# Patient Record
Sex: MALE | Race: WHITE | NOT HISPANIC OR LATINO | Employment: PART TIME | URBAN - METROPOLITAN AREA
[De-identification: names, ages, dates, MRNs, and addresses within clinical notes are randomized per-mention and may not be internally consistent; named-entity substitution may affect disease eponyms.]

---

## 2017-12-15 ENCOUNTER — HOSPITAL ENCOUNTER (OUTPATIENT)
Dept: SLEEP CENTER | Facility: CLINIC | Age: 72
Discharge: HOME | End: 2017-12-15
Payer: MEDICARE

## 2017-12-15 ENCOUNTER — TRANSCRIBE ORDERS (OUTPATIENT)
Dept: SLEEP CENTER | Facility: CLINIC | Age: 72
End: 2017-12-15

## 2017-12-15 DIAGNOSIS — G47.33 OSA (OBSTRUCTIVE SLEEP APNEA): Primary | ICD-10-CM

## 2017-12-15 DIAGNOSIS — G47.33 OSA (OBSTRUCTIVE SLEEP APNEA): ICD-10-CM

## 2018-05-07 ENCOUNTER — OFFICE VISIT (OUTPATIENT)
Dept: SLEEP CENTER | Facility: CLINIC | Age: 73
End: 2018-05-07

## 2018-05-07 VITALS
WEIGHT: 234.4 LBS | SYSTOLIC BLOOD PRESSURE: 110 MMHG | HEART RATE: 72 BPM | DIASTOLIC BLOOD PRESSURE: 70 MMHG | BODY MASS INDEX: 33.56 KG/M2 | HEIGHT: 70 IN

## 2018-05-07 DIAGNOSIS — G47.33 OSA (OBSTRUCTIVE SLEEP APNEA): Primary | ICD-10-CM

## 2018-05-07 DIAGNOSIS — G89.4 CHRONIC PAIN DISORDER: ICD-10-CM

## 2018-05-07 DIAGNOSIS — I10 ESSENTIAL HYPERTENSION: ICD-10-CM

## 2018-05-07 DIAGNOSIS — E66.9 OBESITY (BMI 30-39.9): ICD-10-CM

## 2018-05-07 DIAGNOSIS — G25.81 RESTLESS LEG SYNDROME: ICD-10-CM

## 2018-05-07 DIAGNOSIS — G47.61 PLMD (PERIODIC LIMB MOVEMENT DISORDER): ICD-10-CM

## 2018-05-07 RX ORDER — METHOCARBAMOL 750 MG/1
TABLET, FILM COATED ORAL
Refills: 0 | COMMUNITY
Start: 2018-02-15

## 2018-05-07 RX ORDER — GABAPENTIN 100 MG/1
100 CAPSULE ORAL 3 TIMES DAILY
Refills: 3 | COMMUNITY
Start: 2018-02-15

## 2018-05-07 RX ORDER — TADALAFIL 5 MG
TABLET ORAL
COMMUNITY
Start: 2018-02-20

## 2018-05-07 RX ORDER — ATORVASTATIN CALCIUM 10 MG/1
TABLET, FILM COATED ORAL
COMMUNITY
Start: 2017-10-02

## 2018-05-07 RX ORDER — TRAMADOL HYDROCHLORIDE 50 MG/1
50 TABLET ORAL EVERY 6 HOURS PRN
COMMUNITY
End: 2022-07-28

## 2018-05-07 RX ORDER — KRILL/OM-3/DHA/EPA/PHOSPHO/AST 500MG-86MG
500 CAPSULE ORAL
COMMUNITY

## 2018-05-07 RX ORDER — OLMESARTAN MEDOXOMIL AND HYDROCHLOROTHIAZIDE 40/12.5 40; 12.5 MG/1; MG/1
TABLET ORAL
COMMUNITY
Start: 2018-04-28

## 2018-05-07 RX ORDER — GLUCOSAMINE/CHONDR SU A SOD 750-600 MG
1 TABLET ORAL
COMMUNITY
Start: 2012-12-05

## 2018-05-07 NOTE — PROGRESS NOTES
Follow-Up Note - Sleep Center   Shay Peralta  71 y o  male  :1945  USX:[id number]    CC: I saw this patient for follow-up in clinic today for his Sleep Disordered Breathing, Coexisting Sleep and Medical Problems  Medications, Past, Family & Social History were reviewed  Interval changes: [none reported ]   He  has no past medical history on file  He has a current medication list which includes the following prescription(s): aspirin, atorvastatin, cholecalciferol, cialis, co-enzyme q-10, cyanocobalamin, gabapentin, glucosamine hcl, krill oil, lactobacillus acid-pectin, methocarbamol, multiple vitamins-minerals, olmesartan-hydrochlorothiazide, and tramadol  ROS: reviewed, see attached   HPI:  With respect to sleep disordered breathing, compliance data download shows: [ No data was available, but he reports use for > 4hours regularly]   Kulwinder Branch reports:   · no difficulty tolerating PAP;   · Experiencing minor adverse effects]  [ dry mouth] for which Biotene helps  · benefiting from use ; [sleeping better]   · He has ongoing hip pain due to degenerative joint disease that disturb sleep  · He also reports restless leg symptoms around 2 times a week and it is associated with jerking movements during sleep   · He got a prescription from his primary care physician for gabapentin to address an acute episode of back pain  Sleep Routine:  He reports getting 6-8 hours sleep and at times longer; he has no difficulty initiating, but reports diffculty maintaining sleep  for which he uses over-the-counter sleep aid  He awakens with the aid of an alarm feeling refreshed  He moves excessively during sleep and bed is disheveled  He denies excessive drowsiness  [facility] rated himself at Total score: 2 /24 on the [place] sleepiness scale ]      EXAM: Vitals are stable: Blood pressure 110/70, pulse 72, height 5' 10" (1 778 m), weight 106 kg (234 lb 6 4 oz)  Body mass index is 33 63 kg/m²  Georgette [place] Circumference: 46 cm  Patient is alert, orientated, cooperative [and in no distress]  Mental state [appears normal]  There are [no] facial pressure marks or rashes  Physical findings are essentially unchanged from previous  IMPRESSION:     1  NARCISA (obstructive sleep apnea)     2  Restless leg syndrome     3  PLMD (periodic limb movement disorder)     4  Chronic pain disorder     5  Obesity (BMI 30-39 9)     6  Essential hypertension         PLAN:  1  The patient's current unit has now reached its five-year reasonable useful life and needs to be replaced  2  I reviewed results of the Sleep studies with the patient:  He has diagnostic study in 2009 demonstrated severe obstructive sleep apnea:  AHI of 67 per hour with minimum oxygen saturation of 81% and 46 4% of total sleep time spent with saturations less than 90%  During a subsequent therapeutic study, he is sleep disordered breathing was adequately remediated with nasal CPAP at 17 cm H2O     3  Treatment with  PAP is medically necessary and José Mccann is agreable to continue use  He declined a retitration study  4  Pressure setting: Continue at 16 cm     5  Instruction on care of equipment and strategies to improve comfort with use of PAP were discussed [:controlling mucosal dryness, nasal symptoms and Mask leaks]  6  Care coordinated with DME provider and prescription to replace supplies as needed was provided  7  Need for compliance with therapy and weight reduction were emphasized  8  I advised using gabapentin q h s  for the restless leg symptoms and and in place of Dramamine that he is currently using as a sleep aid  9  With your consent, follow-up is advised in 2 months [to monitor progress, compliance and to adjust therapy]  Thank you for allowing me to participate in the care of this patient      Sincerely,    Tarsha Saleh MD  Board Certified Specialist

## 2018-07-09 ENCOUNTER — OFFICE VISIT (OUTPATIENT)
Dept: SLEEP CENTER | Facility: CLINIC | Age: 73
End: 2018-07-09
Payer: COMMERCIAL

## 2018-07-09 VITALS
WEIGHT: 235 LBS | HEART RATE: 66 BPM | DIASTOLIC BLOOD PRESSURE: 62 MMHG | HEIGHT: 70 IN | SYSTOLIC BLOOD PRESSURE: 118 MMHG | BODY MASS INDEX: 33.64 KG/M2

## 2018-07-09 DIAGNOSIS — G47.33 OSA (OBSTRUCTIVE SLEEP APNEA): Primary | ICD-10-CM

## 2018-07-09 DIAGNOSIS — I10 ESSENTIAL HYPERTENSION: ICD-10-CM

## 2018-07-09 DIAGNOSIS — G47.61 PLMD (PERIODIC LIMB MOVEMENT DISORDER): ICD-10-CM

## 2018-07-09 DIAGNOSIS — G89.4 CHRONIC PAIN DISORDER: ICD-10-CM

## 2018-07-09 DIAGNOSIS — E66.9 OBESITY (BMI 30-39.9): ICD-10-CM

## 2018-07-09 DIAGNOSIS — G25.81 RESTLESS LEG SYNDROME: ICD-10-CM

## 2018-07-09 PROCEDURE — 99214 OFFICE O/P EST MOD 30 MIN: CPT | Performed by: INTERNAL MEDICINE

## 2018-07-09 NOTE — PROGRESS NOTES
Follow-Up Note - Sleep Center   Kirsten Rios  71 y o  male  :1945  [facility]:[id number]    CC: I saw this patient for follow-up in clinic today for his Sleep Disordered Breathing, Coexisting Sleep and Medical Problems  PFSH, Problem List, Medications & Allergies were reviewed in EMR  Interval changes: [none reported ]   He  has no past medical history on file  He has a current medication list which includes the following prescription(s): aspirin, atorvastatin, cialis, co-enzyme q-10, cyanocobalamin, gabapentin, glucosamine hcl, krill oil, multiple vitamins-minerals, olmesartan-hydrochlorothiazide, tramadol, cholecalciferol, lactobacillus acid-pectin, and methocarbamol  ROS: Reviewed (see attached)  HPI:  With respect to sleep disordered breathing, compliance data download shows:  using PAP > 4 hours per night 93% of the time  NISREEN (estimated) 1 7/hour at [90th percentile] pressure of 16 1cm H2O   Rebecca Colbert reports  · no  difficulty tolerating PAP;   · some adverse effects: dry mouth, mask leaks  and mask discomfort  · Benefitting from use: sleeping better   · Restless leg symptoms have improved since he got iron infusion and started iron supplementation  He has also been able to reduce he has gabapentin 200 mg q h s     And he is not requiring tramadol  However, he continues to report jerking movements during sleep  Sleep Routine:  He reports getting 7-1/2 hours sleep on week nights and more on weekends; he  has difficulty initiating and maintaining sleep   He attributes this to lower dose of gabapentin and of mask discomfort  He awakens spontaneously feeling not always refreshed  He denies excessive drowsiness  [facility] rated himself at Total score: 2 /24 on the [place] sleepiness scale ]    Habits:[ reports that he has never smoked  He has never used smokeless tobacco ], [ reports that he drinks alcohol ], [ reports that he does not use drugs  ], Caffeine use: limited , Exercise routine: none but he is active at work  EXAM: /62   Pulse 66   Ht 5' 10" (1 778 m)   Wt 107 kg (235 lb)   BMI 33 72 kg/m²      Patient is alert, orientated, cooperative [and in no distress]  Mental state [appears normal]  Craniofacial anatomy normal  There are [no] facial pressure marks or rashes  Neck Circumference: 46 cm  There are no abnormal neck masses  Nasal airway is [patent ]  Mucous membranes appeared normal  The oral airway [is crowded ] Base of tongue is at Mallampati class IV (only hard palate visible)  [Apart from truncal obesity,] the rest of exam (Heart, Lungs, Abdomen, CNS and Musculoskeletal systems) was unremarkable   IMPRESSION:     1  NARCISA (obstructive sleep apnea)     2  Restless leg syndrome     3  PLMD (periodic limb movement disorder)     4  Chronic pain disorder     5  Obesity (BMI 30-39 9)     6  Essential hypertension         PLAN:  1  Treatment with  PAP is medically necessary and Thomas Proper is agreable to continue use  2  Instruction on care of equipment and strategies to improve comfort with use of PAP were discussed  3  Care coordinated with DME provider and Rx to replace supplies provided  4  Pressure setting:  Continue at 16-18 cm H2O     5  Compliance with therapy was emphasizedcand strategies for weight reduction discussed  6  I advised increasing gabapentin 200-300 q h s   7  With your consent, follow-up is advised in [1 [place] K[place] [or sooner if needed] [to monitor progress, compliance and to adjust therapy]  Thank you for allowing me to participate in the care of this patient      Sincerely,    Authenticated electronically by Andrew Randle MD on 28/05/21   Board Certified Specialist

## 2018-07-09 NOTE — PROGRESS NOTES
Review of Systems      Genitourinary none   Cardiology none   Gastrointestinal none   Neurology need to move extremities and muscle weakness   Constitutional none   Integumentary none   Psychiatry none   Musculoskeletal muscle aches and legs twitching/jerking   Pulmonary none   ENT none   Endocrine none   Hematological none

## 2018-07-13 ENCOUNTER — TELEPHONE (OUTPATIENT)
Dept: SLEEP CENTER | Facility: CLINIC | Age: 73
End: 2018-07-13

## 2021-01-04 ENCOUNTER — IMMUNIZATIONS (OUTPATIENT)
Dept: FAMILY MEDICINE CLINIC | Facility: HOSPITAL | Age: 76
End: 2021-01-04

## 2021-01-04 DIAGNOSIS — Z23 ENCOUNTER FOR IMMUNIZATION: ICD-10-CM

## 2021-01-04 PROCEDURE — 91301 SARS-COV-2 / COVID-19 MRNA VACCINE (MODERNA) 100 MCG: CPT

## 2021-01-04 PROCEDURE — 0011A SARS-COV-2 / COVID-19 MRNA VACCINE (MODERNA) 100 MCG: CPT

## 2021-02-08 ENCOUNTER — IMMUNIZATIONS (OUTPATIENT)
Dept: FAMILY MEDICINE CLINIC | Facility: HOSPITAL | Age: 76
End: 2021-02-08

## 2021-02-08 DIAGNOSIS — Z23 ENCOUNTER FOR IMMUNIZATION: Primary | ICD-10-CM

## 2021-02-08 PROCEDURE — 91301 SARS-COV-2 / COVID-19 MRNA VACCINE (MODERNA) 100 MCG: CPT

## 2021-02-08 PROCEDURE — 0012A SARS-COV-2 / COVID-19 MRNA VACCINE (MODERNA) 100 MCG: CPT

## 2021-08-30 PROBLEM — M15.0 PRIMARY GENERALIZED (OSTEO)ARTHRITIS: Status: ACTIVE | Noted: 2021-08-30

## 2021-08-30 PROBLEM — K21.9 GERD (GASTROESOPHAGEAL REFLUX DISEASE): Status: ACTIVE | Noted: 2021-08-30

## 2021-08-30 PROBLEM — M47.816 LUMBAR SPONDYLOSIS: Status: ACTIVE | Noted: 2021-08-30

## 2021-08-30 PROBLEM — E78.5 HYPERLIPIDEMIA: Status: ACTIVE | Noted: 2021-08-30

## 2021-08-30 PROBLEM — D50.9 IRON DEFICIENCY ANEMIA: Status: ACTIVE | Noted: 2021-08-30

## 2022-03-02 ENCOUNTER — ESTABLISHED COMPREHENSIVE EXAM (OUTPATIENT)
Dept: URBAN - METROPOLITAN AREA CLINIC 6 | Facility: CLINIC | Age: 77
End: 2022-03-02

## 2022-03-02 DIAGNOSIS — H43.813: ICD-10-CM

## 2022-03-02 DIAGNOSIS — H02.885: ICD-10-CM

## 2022-03-02 DIAGNOSIS — H18.513: ICD-10-CM

## 2022-03-02 DIAGNOSIS — H26.492: ICD-10-CM

## 2022-03-02 DIAGNOSIS — H35.371: ICD-10-CM

## 2022-03-02 DIAGNOSIS — H02.882: ICD-10-CM

## 2022-03-02 DIAGNOSIS — Z96.1: ICD-10-CM

## 2022-03-02 PROCEDURE — 92014 COMPRE OPH EXAM EST PT 1/>: CPT

## 2022-03-02 PROCEDURE — 92134 CPTRZ OPH DX IMG PST SGM RTA: CPT

## 2022-03-02 ASSESSMENT — VISUAL ACUITY
OD_CC: 20/30-1
OD_GLARE: 20/70
OS_GLARE: 20/60
OU_CC: J1
OS_CC: 20/30-2

## 2022-03-02 ASSESSMENT — TONOMETRY
OS_IOP_MMHG: 14
OD_IOP_MMHG: 13

## 2022-04-05 ENCOUNTER — SURGERY/PROCEDURE (OUTPATIENT)
Dept: URBAN - METROPOLITAN AREA SURGICAL CENTER 6 | Facility: SURGICAL CENTER | Age: 77
End: 2022-04-05

## 2022-04-05 DIAGNOSIS — H26.492: ICD-10-CM

## 2022-04-05 DIAGNOSIS — Z96.1: ICD-10-CM

## 2022-04-05 PROCEDURE — 66821 AFTER CATARACT LASER SURGERY: CPT

## 2022-04-13 ENCOUNTER — 1 WEEK POST-OP (OUTPATIENT)
Dept: URBAN - METROPOLITAN AREA CLINIC 6 | Facility: CLINIC | Age: 77
End: 2022-04-13

## 2022-04-13 DIAGNOSIS — Z96.1: ICD-10-CM

## 2022-04-13 PROCEDURE — 99024 POSTOP FOLLOW-UP VISIT: CPT

## 2022-04-13 ASSESSMENT — VISUAL ACUITY
OU_CC: J2
OD_CC: 20/25
OS_CC: 20/20

## 2022-04-13 ASSESSMENT — TONOMETRY
OS_IOP_MMHG: 11
OD_IOP_MMHG: 12

## 2022-04-25 PROBLEM — M18.0 PRIMARY OSTEOARTHRITIS OF BOTH FIRST CARPOMETACARPAL JOINTS: Status: ACTIVE | Noted: 2022-04-25

## 2022-04-25 PROBLEM — H04.123 DRY EYE SYNDROME OF BOTH EYES: Status: ACTIVE | Noted: 2022-04-25

## 2022-04-25 PROBLEM — M06.09 SERONEGATIVE ARTHROPATHY OF MULTIPLE SITES (HCC): Status: ACTIVE | Noted: 2022-04-25

## 2022-05-13 ENCOUNTER — EVALUATION (OUTPATIENT)
Dept: OCCUPATIONAL THERAPY | Age: 77
End: 2022-05-13
Payer: COMMERCIAL

## 2022-05-13 DIAGNOSIS — M06.09 SERONEGATIVE ARTHROPATHY OF MULTIPLE SITES (HCC): ICD-10-CM

## 2022-05-13 DIAGNOSIS — M15.0 PRIMARY GENERALIZED HYPERTROPHIC OSTEOARTHROSIS: Primary | ICD-10-CM

## 2022-05-13 DIAGNOSIS — M06.09 RHEUMATOID ARTHRITIS OF MULTIPLE SITES WITHOUT RHEUMATOID FACTOR (HCC): ICD-10-CM

## 2022-05-13 DIAGNOSIS — M18.0 PRIMARY OSTEOARTHRITIS OF BOTH FIRST CARPOMETACARPAL JOINTS: ICD-10-CM

## 2022-05-13 DIAGNOSIS — M15.0 PRIMARY GENERALIZED (OSTEO)ARTHRITIS: ICD-10-CM

## 2022-05-13 PROCEDURE — 97110 THERAPEUTIC EXERCISES: CPT

## 2022-05-13 PROCEDURE — 97165 OT EVAL LOW COMPLEX 30 MIN: CPT

## 2022-05-13 NOTE — PROGRESS NOTES
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)  M06 09                   Assessment  Assessment details: Pt is a 76 y/o male who presents to skilled OT tx with c/o worsening b/l hand and thumb pain (R>L) from arthritis  Pt presents with B/L herbens and devi nodes on digits, as well as R thumb/CMC edema  Pt noted with mild to mod dec ROM of thumb and digits, as well as moderately impaired  strength for age value of hands  Pt reports difficulty with ADL tasks such as cooking, writing, and at work pushing a cart  Therapist fabricated pt 2 thumb spica splints to wear as needed and during ADL tasks, but also edu on KT, arthritis compression glove and comfort cool CMC restriction splint as well  Pt edu and provided with HEP of thumb and digit ROM stretch  Pt would benefit from continued skilled OT tx for B/L hand and thumb pain relief, AD education, and arthritis mgmt/edu to inc independence and QOL  Impairments: abnormal coordination, abnormal or restricted ROM, activity intolerance, impaired physical strength, lacks appropriate home exercise program and pain with function    Symptom irritability: lowBarriers to therapy: Work schedule; limited availability   Understanding of Dx/Px/POC: good   Prognosis: good    Goals  STGs:  1  Pt will report dec pain by 50%  2  Pt will be independent with HEP  3  Pt will inc  strength +5 #  LTGs:  1  Pt will report dec pain by 75%  2  Pt will perf and report pain mgmt techniques for arthritis   3  Pt will inc  and pinch strength to WNLs of age group  4   Pt will inc FOTO score    Plan  Patient would benefit from: skilled occupational therapy and custom splinting  Planned modality interventions: fluidotherapy, thermotherapy: hydrocollator packs and thermotherapy: paraffin bath  Planned therapy interventions: manual therapy, massage, joint mobilization, neuromuscular re-education, orthotic management and training, patient education, orthotic fitting/training, compression, self care, strengthening, stretching, therapeutic activities, therapeutic exercise, home exercise program and functional ROM exercises  Frequency: 1x week  Duration in weeks: 4  Treatment plan discussed with: patient        Subjective Evaluation    History of Present Illness  Mechanism of injury: Pt is a 78 y/o male who current c/o worsening B/L hand and thumb pain over the past several months due to arthritis; R>L  Pt c/o moderate pain as well as moderate difficulty with everyday tasks due to arthritis  Recurrent probem    Quality of life: fair    Pain  Current pain ratin  At best pain ratin  At worst pain ratin  Quality: dull ache, tight and pulling  Relieving factors: rest, support and heat  Progression: worsening    Hand dominance: left    Treatments  Current treatment: occupational therapy  Patient Goals  Patient goals for therapy: decreased pain, increased strength, return to work and independence with ADLs/IADLs          Objective     Observations     Left Wrist/Hand   Positive for Sonali's nodes and Heberden's nodes  Right Wrist/Hand   Positive for Sonali's nodes and Heberden's nodes  Additional Observation Details  B/L thumb and digit arthritis, R>L      Neurological Testing     Sensation     Wrist/Hand   Left   Intact: light touch, pin prick and sharp/dull discrimination    Right   Intact: light touch, pin prick and sharp/dull discrimination    Active Range of Motion     Left Wrist   Normal active range of motion    Right Wrist   Normal active range of motion    Left Thumb   Flexion     MP: 35 degrees    DIP: 30 degrees  Palmar Abduction     CMC: 40 degrees  Radial abduction    CMC: 50 degrees    Right Thumb   Flexion     MP: 30    DIP: 30  Palmar Abduction    CMC: 40  Radial Abduction    CMC: 45    Right Digits   Flexion   Index     MCP: 75    PIP: 70    DIP: 60  Middle     MCP: 70    PIP: 70    DIP: 55  Ring     MCP: 75    PIP: 60    DIP: 35  Little     MCP: 75    PIP: 65    DIP: 50  Extension   Little     PIP: -15    Strength/Myotome Testing     Left Wrist/Hand   Wrist extension: 4  Wrist flexion: 4  Radial deviation: 4  Ulnar deviation: 4     (2nd hand position)     Trial 1: 35    Thumb Strength  Key/Lateral Pinch     Trial 1: 18  Tip/Two-Point Pinch     Trial 1: 9  Palmar/Three-Point Pinch     Trial 1: 13    Right Wrist/Hand   Wrist extension: 4  Wrist flexion: 4  Radial deviation: 4  Ulnar deviation: 4     (2nd hand position)     Trial 1: 28 4    Thumb Strength   Key/Lateral Pinch     Trial 1: 18  Tip/Two-Point Pinch     Trial 1: 6  Palmar/Three-Point Pinch     Trial 1: 9    Tests     Left Wrist/Hand   Positive CMC grind  Right Wrist/Hand   Positive CMC grind  Precautions: Universal  HEP- thumb/CMC stretching ([address]), TGE for digits, thumb spica for nights and work tasks PRN        Manuals             IASTM CMC             PROM thumb             KT             Orthosis anabelle             Neuro Re-Ed             Arthritis edu                                                                                           Ther Ex             TGE             Thumb ROM             CMC ABD rolls on TB             OK pinch                                                                 Ther Activity             FMC/strengthening pinch                          Gait Training                                       Modalities             MP/CP             Paraffin

## 2022-05-16 ENCOUNTER — OFFICE VISIT (OUTPATIENT)
Dept: OCCUPATIONAL THERAPY | Age: 77
End: 2022-05-16
Payer: COMMERCIAL

## 2022-05-16 DIAGNOSIS — M06.09 SERONEGATIVE ARTHROPATHY OF MULTIPLE SITES (HCC): ICD-10-CM

## 2022-05-16 DIAGNOSIS — M15.0 PRIMARY GENERALIZED HYPERTROPHIC OSTEOARTHROSIS: Primary | ICD-10-CM

## 2022-05-16 DIAGNOSIS — M15.0 PRIMARY GENERALIZED (OSTEO)ARTHRITIS: ICD-10-CM

## 2022-05-16 DIAGNOSIS — M18.0 PRIMARY OSTEOARTHRITIS OF BOTH FIRST CARPOMETACARPAL JOINTS: ICD-10-CM

## 2022-05-16 PROCEDURE — 97140 MANUAL THERAPY 1/> REGIONS: CPT

## 2022-05-16 PROCEDURE — 97110 THERAPEUTIC EXERCISES: CPT

## 2022-05-16 NOTE — PROGRESS NOTES
Daily Note     Today's date: 2022  Patient name: Kayode Mancilla  :   MRN: [id number]  Referring provider: Ramy Wilder MD  Dx:   Encounter Diagnosis     ICD-10-CM    1  Primary generalized hypertrophic osteoarthrosis  M15 0    2  Primary osteoarthritis of both first carpometacarpal joints  M18 0    3  Primary generalized (osteo)arthritis  M15 0    4  Seronegative arthropathy of multiple sites ([address] )  M06 09                   Subjective: The splint I think really helps      Objective: See treatment diary below      Assessment: Tolerated treatment well  Patient would benefit from continued OT  Trialed [employer] with good tolerance and he reported enjoying the heat cream  Noted loosening up of [address] post treatment      Plan: Continue per plan of care  Precautions: Universal  HEP- thumb/CMC stretching (H drive), TGE for digits, thumb spica for nights and work tasks PRN        Manuals             IASTM CMC 10' STM            PROM thumb             KT             Orthosis anabelle             Neuro Re-Ed             Arthritis edu                                                                                           Ther Ex             TGE             Thumb ROM             CMC ABD rolls on TB 10x b/l            OK pinch [place]                                                                Ther Activity             FMC/strengthening pinch Ke[place]gs 1x                         Gait Training                                       Modalities             MP/CP 5'            Paraffin

## 2022-05-18 ENCOUNTER — APPOINTMENT (OUTPATIENT)
Dept: OCCUPATIONAL THERAPY | Age: 77
End: 2022-05-18
Payer: COMMERCIAL

## 2022-05-23 ENCOUNTER — OFFICE VISIT (OUTPATIENT)
Dept: OCCUPATIONAL THERAPY | Age: 77
End: 2022-05-23
Payer: COMMERCIAL

## 2022-05-23 DIAGNOSIS — M15.0 PRIMARY GENERALIZED HYPERTROPHIC OSTEOARTHROSIS: ICD-10-CM

## 2022-05-23 DIAGNOSIS — M06.09 RHEUMATOID ARTHRITIS OF MULTIPLE SITES WITHOUT RHEUMATOID FACTOR (HCC): Primary | ICD-10-CM

## 2022-05-23 DIAGNOSIS — M18.0 PRIMARY OSTEOARTHRITIS OF BOTH FIRST CARPOMETACARPAL JOINTS: ICD-10-CM

## 2022-05-23 PROCEDURE — 97110 THERAPEUTIC EXERCISES: CPT

## 2022-05-23 PROCEDURE — 97140 MANUAL THERAPY 1/> REGIONS: CPT

## 2022-05-23 NOTE — PROGRESS NOTES
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)  M06 09                   Subjective: 'My R thumb already feels 50% better with the splint and the phone clip"      Objective: See treatment diary below      Assessment: Tolerated treatment well  Patient would benefit from continued OT  Pt reported AD for everyday tasks have healed dec his pain  Edu on stretching of thumb as well as IF and web space  Pt reported G relief with heat, STM and stretching  Plan: Continue per plan of care  Precautions: Universal  HEP- thumb/CMC stretching (H drive), TGE for digits, thumb spica for nights and work tasks PRN        Manuals            IASTM CMC 10' STM 10'           PROM thumb  2'           KT             Orthosis anabelle             Neuro Re-Ed             Arthritis edu  perf                                                                                         Ther Ex             TGE             Thumb ROM  10x 10sec           CMC ABD rolls on TB 10x b/l 10x           OK pinch [place] pegs           Web stretch  CP and passively from therapist 5'           IF stretch  On ball ABD/ADD                                     Ther Activity             FMC/strengthening pinch [place] 1x                         Gait Training                          fluido  8' ROM           Modalities             MP/CP 5'            Paraffin

## 2022-06-03 ENCOUNTER — OFFICE VISIT (OUTPATIENT)
Dept: OCCUPATIONAL THERAPY | Age: 77
End: 2022-06-03
Payer: COMMERCIAL

## 2022-06-03 DIAGNOSIS — M18.0 PRIMARY OSTEOARTHRITIS OF BOTH FIRST CARPOMETACARPAL JOINTS: ICD-10-CM

## 2022-06-03 DIAGNOSIS — M15.0 PRIMARY GENERALIZED (OSTEO)ARTHRITIS: ICD-10-CM

## 2022-06-03 DIAGNOSIS — M06.09 SERONEGATIVE ARTHROPATHY OF MULTIPLE SITES (HCC): ICD-10-CM

## 2022-06-03 DIAGNOSIS — M15.0 PRIMARY GENERALIZED HYPERTROPHIC OSTEOARTHROSIS: Primary | ICD-10-CM

## 2022-06-03 PROCEDURE — 97110 THERAPEUTIC EXERCISES: CPT | Performed by: OCCUPATIONAL THERAPIST

## 2022-06-03 PROCEDURE — 97140 MANUAL THERAPY 1/> REGIONS: CPT | Performed by: OCCUPATIONAL THERAPIST

## 2022-06-03 PROCEDURE — 97022 WHIRLPOOL THERAPY: CPT | Performed by: OCCUPATIONAL THERAPIST

## 2022-06-03 NOTE — PROGRESS NOTES
Daily Note     Today's date: 6/3/2022  Patient name: Alexandra Giles  : [birth year]  MRN: [id number]  Referring provider: Chrissy Albert MD  Dx:   Encounter Diagnosis     ICD-10-CM    1  Primary generalized hypertrophic osteoarthrosis  M15 0    2  Primary osteoarthritis of both first carpometacarpal joints  M18 0    3  Primary generalized (osteo)arthritis  M15 0    4  Seronegative arthropathy of multiple sites ([address] )  M06 09                   Subjective: "My hands are a little sore but feeling better "      Objective: See treatment diary below      Assessment: Pasha Carr presented with some soreness from the OA  PRE's were tolerated well and showed gross improvements          Plan: Continue per plan of care  Precautions: Universal  HEP- thumb/CMC stretching ([address]), TGE for digits, thumb spica for nights and work tasks PRN        Manuals  6/3          IASTM CMC 10' STM 10' 10          PROM thumb  2' 5          KT             Orthosis anabelle             Neuro Re-Ed             Arthritis edu  perf                                                                                         Ther Ex             TGE             Thumb ROM  10x 10sec Thumb hops 2x10           CMC ABD rolls on TB 10x b/l 10x 10x          OK pinch [place] pegs pegs          Web stretch  CP and passively from therapist 5'           IF stretch  On ball ABD/ADD On ball ABD/ADD  All fingers                                      Ther Activity             FMC/strengthening pinch [place]gs 1x                         Gait Training                          fluido  8' ROM 10 AROM          Modalities             MP/CP 5'            Paraffin

## 2022-06-10 ENCOUNTER — APPOINTMENT (OUTPATIENT)
Dept: OCCUPATIONAL THERAPY | Age: 77
End: 2022-06-10
Payer: COMMERCIAL

## 2022-06-14 ENCOUNTER — OFFICE VISIT (OUTPATIENT)
Dept: OCCUPATIONAL THERAPY | Age: 77
End: 2022-06-14
Payer: COMMERCIAL

## 2022-06-14 DIAGNOSIS — M06.09 RHEUMATOID ARTHRITIS OF MULTIPLE SITES WITHOUT RHEUMATOID FACTOR (HCC): Primary | ICD-10-CM

## 2022-06-14 PROCEDURE — 97140 MANUAL THERAPY 1/> REGIONS: CPT

## 2022-06-14 PROCEDURE — 97168 OT RE-EVAL EST PLAN CARE: CPT

## 2022-06-14 PROCEDURE — 97110 THERAPEUTIC EXERCISES: CPT

## 2022-06-14 NOTE — PROGRESS NOTES
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)  M06 09                   Subjective: "My hands are a little sore but feeling better "      Objective: See treatment diary below      Assessment: Ya Felton presented with some soreness from the OA  PRE's were tolerated well and showed gross improvements          Plan: Continue per plan of care  Precautions: Universal  HEP- thumb/CMC stretching ([address]), TGE for digits, thumb spica for nights and work tasks PRN        Manuals 5/16 5/23 6/3 6/14         IASTM CMC 10' STM 10' 10 10'         PROM thumb  2' 5 5'         KT             Orthosis anabelle             Neuro Re-Ed             Arthritis edu  perf                                                                                         Ther Ex             TGE             Thumb ROM  10x 10sec Thumb hops 2x10  2x10 30 sec hold         CMC ABD rolls on TB 10x b/l 10x 10x          OK pinch [place] pegs pegs          Web stretch  CP and passively from therapist 5'           IF stretch  On ball ABD/ADD On ball ABD/ADD  All fingers   On ball ABD/ADD  All fingers         Progressive     pencils 2x; isotubes 5x 10 sec                      Ther Activity             FMC/strengthening pinch [place] 1x                         Gait Training                          fluido  8' ROM 10 AROM 8' ROM         Modalities             MP/CP 5'            Paraffin

## 2022-06-15 NOTE — PROGRESS NOTES
OT Re-Evaluation     Today's date: 2022  Patient name: Denise Rodriguez  : [birth year]  MRN: [id number]  Referring provider: Rosalie Espinoza MD  Dx:   Encounter Diagnosis     ICD-10-CM    1  Rheumatoid arthritis of multiple sites without rheumatoid factor [facility])  M06 09                   Assessment  Assessment details: 22- Pt has been participating in skilled OT tx for B/L thumb OA and pain  Pt reports dec pain with thumb AROM/STM as well as education for arthritis mgmt and AD- pt reporting 50% of dec pain from adaptive equipment changes and how he uses his phone  Pt noted with inc stiffness and dec ROM of fingers; but now after exercise, and use of arthritis gloves pt noted with inc ROM and ease in moving hands and work tasks  Pt would benefit from skilled OT tx for review of HEP before d/c  Pt is a 78 y/o male who presents to skilled OT tx with c/o worsening b/l hand and thumb pain (R>L) from arthritis  Pt presents with B/L herbens and devi nodes on digits, as well as R thumb/CMC edema  Pt noted with mild to mod dec ROM of thumb and digits, as well as moderately impaired  strength for age value of hands  Pt reports difficulty with ADL tasks such as cooking, writing, and at work pushing a cart  Therapist fabricated pt 2 thumb spica splints to wear as needed and during ADL tasks, but also edu on KT, arthritis compression glove and comfort cool CMC restriction splint as well  Pt edu and provided with HEP of thumb and digit ROM stretch  Pt would benefit from continued skilled OT tx for B/L hand and thumb pain relief, AD education, and arthritis mgmt/edu to inc independence and QOL     Impairments: abnormal coordination, abnormal or restricted ROM, activity intolerance, impaired physical strength, lacks appropriate home exercise program and pain with function    Symptom irritability: lowBarriers to therapy: Work schedule; limited availability   Understanding of Dx/Px/POC: good   Prognosis: good    Goals  STGs:  1  Pt will report dec pain by 50% met  2  Pt will be independent with HEP progressing  3  Pt will inc  strength +5 # progressing  LTGs:  1  Pt will report dec pain by 75%  2  Pt will perf and report pain mgmt techniques for arthritis   3  Pt will inc  and pinch strength to WNLs of age group  4  Pt will inc FOTO score    Plan  Patient would benefit from: skilled occupational therapy and custom splinting  Planned modality interventions: fluidotherapy, thermotherapy: hydrocollator packs and thermotherapy: paraffin bath  Planned therapy interventions: manual therapy, massage, joint mobilization, neuromuscular re-education, orthotic management and training, patient education, orthotic fitting/training, compression, self care, strengthening, stretching, therapeutic activities, therapeutic exercise, home exercise program and functional ROM exercises  Frequency: 1x week  Duration in weeks: 2  Plan of Care beginning date: 2022  Plan of Care expiration date: 2022  Treatment plan discussed with: patient        Subjective Evaluation    History of Present Illness  Mechanism of injury: Pt is a 78 y/o male who current c/o worsening B/L hand and thumb pain over the past several months due to arthritis; R>L  Pt c/o moderate pain as well as moderate difficulty with everyday tasks due to arthritis  Recurrent probem    Quality of life: fair    Pain  Current pain ratin  At best pain ratin  At worst pain ratin  Quality: dull ache, tight and pulling  Relieving factors: rest, support and heat  Progression: improved    Hand dominance: left    Treatments  Current treatment: occupational therapy  Patient Goals  Patient goals for therapy: decreased pain, increased strength, return to work and independence with ADLs/IADLs          Objective     Observations     Left Wrist/Hand   Positive for Sonali's nodes and Heberden's nodes       Right Wrist/Hand   Positive for Sonali's nodes and Heberden's nodes  Additional Observation Details  B/L thumb and digit arthritis, R>L  Neurological Testing     Sensation     Wrist/Hand   Left   Intact: light touch, pin prick and sharp/dull discrimination    Right   Intact: light touch, pin prick and sharp/dull discrimination    Active Range of Motion     Left Wrist   Normal active range of motion    Right Wrist   Normal active range of motion    Left Thumb   Flexion     MP: 35 degrees    DIP: 30 degrees  Palmar Abduction     CMC: 40 degrees  Radial abduction    CMC: 50 degrees    Right Thumb   Flexion     MP: 30    DIP: 30  Palmar Abduction    CMC: 40  Radial Abduction    CMC: 45    Right Digits   Flexion   Index     MCP: 75    PIP: 80    DIP: 65  Middle     MCP: 75    PIP: 80    DIP: 60  Ring     MCP: 80    PIP: 80    DIP: 40  Little     MCP: 80    PIP: 75    DIP: 45  Extension   Little     PIP: -15    Strength/Myotome Testing     Left Wrist/Hand   Wrist extension: 4  Wrist flexion: 4  Radial deviation: 4  Ulnar deviation: 4     (2nd hand position)     Trial 1: 35    Thumb Strength  Key/Lateral Pinch     Trial 1: 18  Tip/Two-Point Pinch     Trial 1: 9  Palmar/Three-Point Pinch     Trial 1: 13    Right Wrist/Hand   Wrist extension: 4  Wrist flexion: 4  Radial deviation: 4  Ulnar deviation: 4     (2nd hand position)     Trial 1: 28 4    Thumb Strength   Key/Lateral Pinch     Trial 1: 18  Tip/Two-Point Pinch     Trial 1: 6  Palmar/Three-Point Pinch     Trial 1: 9    Tests     Left Wrist/Hand   Positive CMC grind  Right Wrist/Hand   Positive CMC grind  Precautions: Universal  HEP- thumb/CMC stretching (H drive), TGE for digits, thumb spica for nights and work tasks PRN        Manuals 5/16 5/23 6/3 6/14         IASTM CMC 10' STM 10' 10 10'         PROM thumb  2' 5 5'         KT             Orthosis anabelle             Neuro Re-Ed             Arthritis edu  perf Ther Ex             TGE             Thumb ROM  10x 10sec Thumb hops 2x10  2x10 30 sec hold         CMC ABD rolls on TB 10x b/l 10x 10x          OK pinch [place] pegs pegs          Web stretch  CP and passively from therapist 5'           IF stretch  On ball ABD/ADD On ball ABD/ADD  All fingers   On ball ABD/ADD  All fingers         Progressive     pencils 2x; isotubes 5x 10 sec                      Ther Activity             FMC/strengthening pinch Keypegs 1x                         Gait Training                          fluido  8' ROM 10 AROM 8' ROM         Modalities             MP/CP 5'            Paraffin

## 2022-06-21 ENCOUNTER — OFFICE VISIT (OUTPATIENT)
Dept: OCCUPATIONAL THERAPY | Age: 77
End: 2022-06-21
Payer: COMMERCIAL

## 2022-06-21 DIAGNOSIS — M15.0 PRIMARY GENERALIZED HYPERTROPHIC OSTEOARTHROSIS: ICD-10-CM

## 2022-06-21 DIAGNOSIS — M06.09 RHEUMATOID ARTHRITIS OF MULTIPLE SITES WITHOUT RHEUMATOID FACTOR (HCC): Primary | ICD-10-CM

## 2022-06-21 PROCEDURE — 97110 THERAPEUTIC EXERCISES: CPT

## 2022-06-21 PROCEDURE — 97140 MANUAL THERAPY 1/> REGIONS: CPT

## 2022-06-21 NOTE — PROGRESS NOTES
Daily Note     Today's date: 2022  Patient name: Radha Luke  :   MRN: [id number]  Referring provider: Chaz Jefferson MD  Dx:   Encounter Diagnosis     ICD-10-CM    1  Rheumatoid arthritis of multiple sites without rheumatoid factor (HCC)  M06 09    2  Primary generalized hypertrophic osteoarthrosis  M15 0                   Subjective: 'Things have gotten better "      Objective: See treatment diary below      Assessment: Tolerated treatment well  Patient noted with F ROM of thumb- stiffness at Ips noted  Pt edu and perf G return demonstration of HEP of thumb ROM/stretching, distraction stretch, CMC strengthening, and stabilization  Plan: D/C to HEP     Precautions: Universal  HEP- thumb/CMC stretching (H drive), TGE for digits, thumb spica for nights and work tasks PRN        Manuals 5/16 5/23 6/3 6/14 6/21        IASTM CMC 10' STM 10' 10 10' 5'        PROM thumb  2' 5 5' 5'        KT             Orthosis anabelle             Neuro Re-Ed             Arthritis edu  perf                                                                                         Ther Ex             TGE             Thumb ROM  10x 10sec Thumb hops 2x10  2x10 30 sec hold 2x10 30 sec old        CMC ABD rolls on TB 10x b/l 10x 10x  2x2o        OK pinch [place] pegs pegs          Web stretch  CP and passively from therapist 5'   Cp 2x 30 sec        IF stretch  On ball ABD/ADD On ball ABD/ADD  All fingers   On ball ABD/ADD  All fingers On ball ABD/ADD  All fingers        Progressive     pencils 2x; isotubes 5x 10 sec         strengthening     ABD isometrics, RB extensions 10x 10 sec        Ther Activity             FMC/strengthening pinch Keypegs 1x                         Gait Training                          fluido  8' ROM 10 AROM 8' ROM         Modalities             MP/CP 5'            Paraffin

## 2022-07-28 PROBLEM — M06.09 SERONEGATIVE ARTHROPATHY OF MULTIPLE SITES (HCC): Status: RESOLVED | Noted: 2022-04-25 | Resolved: 2022-07-28

## 2022-08-19 ENCOUNTER — TELEPHONE (OUTPATIENT)
Dept: SLEEP CENTER | Facility: CLINIC | Age: 77
End: 2022-08-19

## 2022-08-19 NOTE — TELEPHONE ENCOUNTER
----- Message from Evy Hodge sent at 8/18/2022 10:16 AM EDT -----  Regarding: Daphnie  Patient is schedule for DISE on 9/22/22  He needs consult for Daphnie  (0) independent

## 2022-09-13 ENCOUNTER — TELEPHONE (OUTPATIENT)
Dept: SLEEP CENTER | Facility: CLINIC | Age: 77
End: 2022-09-13

## 2022-09-13 ENCOUNTER — OFFICE VISIT (OUTPATIENT)
Dept: SLEEP CENTER | Facility: CLINIC | Age: 77
End: 2022-09-13
Payer: COMMERCIAL

## 2022-09-13 VITALS
DIASTOLIC BLOOD PRESSURE: 71 MMHG | HEART RATE: 72 BPM | SYSTOLIC BLOOD PRESSURE: 156 MMHG | BODY MASS INDEX: 33.92 KG/M2 | WEIGHT: 229 LBS | HEIGHT: 69 IN

## 2022-09-13 DIAGNOSIS — G47.33 OSA (OBSTRUCTIVE SLEEP APNEA): Primary | ICD-10-CM

## 2022-09-13 PROCEDURE — 99203 OFFICE O/P NEW LOW 30 MIN: CPT | Performed by: PSYCHIATRY & NEUROLOGY

## 2022-09-13 NOTE — PATIENT INSTRUCTIONS
If you do not like the air pressure change on your machine, please contact me through the patient portal   Sometimes it may take a few days to get used to the new air pressure  Nursing Support:  When: Monday through Friday 7A-5PM except holidays  Where: Our direct line is [phone]  If you are having a true emergency please call 911  In the event that the line is busy or it is after hours please leave a voice message and we will return your call  Please speak clearly, leaving your full name, birth date, best number to reach you and the reason for your call  Medication refills: We will need the name of the medication, the dosage, the ordering provider, whether you get a 30 or 90 day refill, and the pharmacy name and address  Medications will be ordered by the provider only  Nurses cannot call in prescriptions  Please allow 7 days for medication refills  Physician requested updates: If your provider requested that you call with an update after starting medication, please be ready to provide us the medication and dosage, what time you take your medication, the time you attempt to fall asleep, time you fall asleep, when you wake up, and what time you get out of bed  Sleep Study Results: We will contact you with sleep study results and/or next steps after the physician has reviewed your testing  It is very important to avoid driving while drowsy, this can be very dangerous or even cause serious injury or death  If sleepy, it is not safe to get behind the wheel  If you are driving and feels sleepy, it is very important to pull over right away  Even losing control of the car for a split second can be deadly  If you feel you cannot control when sleepiness occurs and cannot prevented, it is important to not drive at all until this improves  Please let me know if you experience this as it is very important

## 2022-09-13 NOTE — ASSESSMENT & PLAN NOTE
We discussed that his most recent home sleep test is within the range to qualify for a hypoglossal nerve stimulator  We discussed that his previous result in 2009 would not qualify  He is using CPAP and does fairly well with it but certainly describes a preference to switch therapies  We went over the pros and cons of a hypoglossal nerve stimulator, we discussed that the results may not be completely ideal after maximal adjustment  As noted,  sleep endoscopy is scheduled in the near future, if this is compatible with the requirements for hypoglossal nerve stimulator, he likely would qualify  We also discussed that his BMI is above 32, for best results, patients do better with a BMI is less than 32 in therefore weight loss is extremely important

## 2022-09-13 NOTE — H&P (VIEW-ONLY)
Assessment/Plan:      1  NARCISA (obstructive sleep apnea)  Assessment & Plan:  We discussed that his most recent home sleep test is within the range to qualify for a hypoglossal nerve stimulator  We discussed that his previous result in 2009 would not qualify  He is using CPAP and does fairly well with it but certainly describes a preference to switch therapies  We went over the pros and cons of a hypoglossal nerve stimulator, we discussed that the results may not be completely ideal after maximal adjustment  As noted,  sleep endoscopy is scheduled in the near future, if this is compatible with the requirements for hypoglossal nerve stimulator, he likely would qualify  We also discussed that his BMI is above 32, for best results, patients do better with a BMI is less than 32 in therefore weight loss is extremely important  Orders:  -     PAP DME Pressure Change           Subjective:      Patient ID: Coleen Noriega is a 69 y o  male  This is a 69year old male who presents as he has interest in a hypoglossal nerve stimulator  He has a desire to obtain a good night of sleep  He initially presented for assessment in 2009 due to snoring and poor sleep  A PSG then showed an AHI of 67, was titrated to CPAP 17 cm h20 to eliminate snoring though respiratory events eliminated at 13 cm h20   Weight was 223 lb on that report    He was prescribed CPAP and has been using CPAP since that time  He has a [place]ville machine which is under recall  The patient finds CPAP to be too much of a hassle, feels he does not get a good night's sleep with CPAP  He finds CPAP helps but still does not feel he has a good quality sleep  Has a lot of dryness    He works as a  for the [employer] [place] working an 8:30 a m -3:30 p m  schedule, three days a week  He was referred by Dr Ba Peterson  The patient initially consulted with Dr Ba Peterson in February of this year    His note documents the patient has been on CPAP but cannot tolerate it  He has a Orville machine at home but was not able to use it due to the recall  He stopped CPAP for a few nights prior to this home sleep test   As a result, the patient had a home sleep test in April 2022  This test showed an respiratory event index of 17 on the 1st night of testing, 36 on the 2nd of testing using the 4% desaturation rule  The mean respiratory event index was 30  There were no unclassified apneas identified on this test   As a result of the above, the patient is scheduled for a drug-induced sleep endoscopy next week and was referred to me for assessment  Patient goes to bed at 10:00 p m-1030 pm   Falls asleep in 20-30 minutes  Wakes two times a night, falls back asleep easily  Ultimately wakes at  9 AM, sleeping 9 hours  When working, in bed by 10 pm and wakes at 6 AM     He feels mostly refreshed, but "feels stiff"  He does not take naps during the day  He has an [place] Sleepiness Scale score of 4  No drowsy driving  Does not doze     He is aware of loud snoring without CPAP  Does not snore with CPAP  He denies gasping in sleep or witnessed apneas  With PAP has dryness in the throat/mouth  Gets a runny nose with PAP  No nose bleeds  He has not had sleepwalking, restless legs, or dream enactment  Had RLS in the past, takes an iron supplement  RLS not active at present  Sees a hematologist     He drinks 8 oz of coffee a day, he is a former cigarette smoker having quit in [address] [place] [address]  PAP Data  Dreamstation set at 16-18 cm h20  AHI 10  90% pressure- 18 cm h20  Leak- 30 min/night  Average sesison 9 hr 45 min   Used 30/30 nights     [place] Sleepiness Scale:     Sitting and reading: Slight chance of dozing  Watching TV: Slight chance of dozing  Sitting, inactive in a public place (e g  a theatre or a meeting):  Would never doze  As a passenger in a car for an hour without a break: Would never doze  Lying down to rest in the afternoon when circumstances permit: Moderate chance of dozing  Sitting and talking to someone: Would never doze  Sitting quietly after a lunch without alcohol: Would never doze  In a car, while stopped for a few minutes in traffic: Would never doze  Total score: 4     The following portions of the patient's history were reviewed and updated as appropriate: allergies, current medications, past family history, past medical history, past social history, past surgical history, and problem list     Review of Systems    Genitourinary need to urinate more than twice a night   Cardiology none   Gastrointestinal none   Neurology difficulty with memory   Constitutional none   Integumentary none   Psychiatry none   Musculoskeletal none   Pulmonary none   ENT none   Endocrine none   Hematological none       Objective:        /71 (BP Location: Left arm, Patient Position: Sitting, Cuff Size: Large)   Pulse 72   Ht 5' 9" (1 753 m)   Wt 104 kg (229 lb)   BMI 33 82 kg/m²     Physical Exam  Constitutional:       Appearance: He is obese  HENT:      Head: Normocephalic and atraumatic  Mouth/Throat:      Mouth: Mucous membranes are moist    Eyes:      Extraocular Movements: Extraocular movements intact  Pupils: Pupils are equal, round, and reactive to light  Cardiovascular:      Rate and Rhythm: Normal rate  Pulses: Normal pulses  Heart sounds: Normal heart sounds  Pulmonary:      Effort: Pulmonary effort is normal       Breath sounds: Normal breath sounds  Musculoskeletal:      Right lower leg: No edema  Left lower leg: No edema  Neurological:      Mental Status: He is alert  Psychiatric:         Mood and Affect: Mood normal          Behavior: Behavior normal          Thought Content: Thought content normal          Judgment: Judgment normal         18 inch neck circumference  mallampati 4 airway, wide tongue   No tonsillar hypertrophy     Data reviewed-CPAP data, notes from Dr Elisa Cosby

## 2022-09-13 NOTE — PROGRESS NOTES
Assessment/Plan:      1  NARCISA (obstructive sleep apnea)  Assessment & Plan:  We discussed that his most recent home sleep test is within the range to qualify for a hypoglossal nerve stimulator  We discussed that his previous result in 2009 would not qualify  He is using CPAP and does fairly well with it but certainly describes a preference to switch therapies  We went over the pros and cons of a hypoglossal nerve stimulator, we discussed that the results may not be completely ideal after maximal adjustment  As noted,  sleep endoscopy is scheduled in the near future, if this is compatible with the requirements for hypoglossal nerve stimulator, he likely would qualify  We also discussed that his BMI is above 32, for best results, patients do better with a BMI is less than 32 in therefore weight loss is extremely important  Orders:  -     PAP DME Pressure Change           Subjective:      Patient ID: Riaz Quispe is a 69 y o  male  This is a 69year old male who presents as he has interest in a hypoglossal nerve stimulator  He has a desire to obtain a good night of sleep  He initially presented for assessment in 2009 due to snoring and poor sleep  A PSG then showed an AHI of 67, was titrated to CPAP 17 cm h20 to eliminate snoring though respiratory events eliminated at 13 cm h20   Weight was 223 lb on that report    He was prescribed CPAP and has been using CPAP since that time  He has a Orville machine which is under recall  The patient finds CPAP to be too much of a hassle, feels he does not get a good night's sleep with CPAP  He finds CPAP helps but still does not feel he has a good quality sleep  Has a lot of dryness    He works as a  for the [employer] [place] working an 8:30 a m -3:30 p m  schedule, three days a week  He was referred by Dr Ying Kidd  The patient initially consulted with Dr Ying Kidd in February of this year    His note documents the patient has been on CPAP but cannot tolerate it  He has a Orville machine at home but was not able to use it due to the recall  He stopped CPAP for a few nights prior to this home sleep test   As a result, the patient had a home sleep test in April 2022  This test showed an respiratory event index of 17 on the 1st night of testing, 36 on the 2nd of testing using the 4% desaturation rule  The mean respiratory event index was 30  There were no unclassified apneas identified on this test   As a result of the above, the patient is scheduled for a drug-induced sleep endoscopy next week and was referred to me for assessment  Patient goes to bed at 10:00 p m-1030 pm   Falls asleep in 20-30 minutes  Wakes two times a night, falls back asleep easily  Ultimately wakes at  9 AM, sleeping 9 hours  When working, in bed by 10 pm and wakes at 6 AM     He feels mostly refreshed, but "feels stiff"  He does not take naps during the day  He has an [place] Sleepiness Scale score of 4  No drowsy driving  Does not doze     He is aware of loud snoring without CPAP  Does not snore with CPAP  He denies gasping in sleep or witnessed apneas  With PAP has dryness in the throat/mouth  Gets a runny nose with PAP  No nose bleeds  He has not had sleepwalking, restless legs, or dream enactment  Had RLS in the past, takes an iron supplement  RLS not active at present  Sees a hematologist     He drinks 8 oz of coffee a day, he is a former cigarette smoker having quit in [address] [place] [address]  PAP Data  Dreamstation set at 16-18 cm h20  AHI 10  90% pressure- 18 cm h20  Leak- 30 min/night  Average sesison 9 hr 45 min   Used 30/30 nights     [place] Sleepiness Scale:     Sitting and reading: Slight chance of dozing  Watching TV: Slight chance of dozing  Sitting, inactive in a public place (e g  a theatre or a meeting):  Would never doze  As a passenger in a car for an hour without a break: Would never doze  Lying down to rest in the afternoon when circumstances permit: Moderate chance of dozing  Sitting and talking to someone: Would never doze  Sitting quietly after a lunch without alcohol: Would never doze  In a car, while stopped for a few minutes in traffic: Would never doze  Total score: 4     The following portions of the patient's history were reviewed and updated as appropriate: allergies, current medications, past family history, past medical history, past social history, past surgical history, and problem list     Review of Systems    Genitourinary need to urinate more than twice a night   Cardiology none   Gastrointestinal none   Neurology difficulty with memory   Constitutional none   Integumentary none   Psychiatry none   Musculoskeletal none   Pulmonary none   ENT none   Endocrine none   Hematological none       Objective:        /71 (BP Location: Left arm, Patient Position: Sitting, Cuff Size: Large)   Pulse 72   Ht 5' 9" (1 753 m)   Wt 104 kg (229 lb)   BMI 33 82 kg/m²     Physical Exam  Constitutional:       Appearance: He is obese  HENT:      Head: Normocephalic and atraumatic  Mouth/Throat:      Mouth: Mucous membranes are moist    Eyes:      Extraocular Movements: Extraocular movements intact  Pupils: Pupils are equal, round, and reactive to light  Cardiovascular:      Rate and Rhythm: Normal rate  Pulses: Normal pulses  Heart sounds: Normal heart sounds  Pulmonary:      Effort: Pulmonary effort is normal       Breath sounds: Normal breath sounds  Musculoskeletal:      Right lower leg: No edema  Left lower leg: No edema  Neurological:      Mental Status: He is alert  Psychiatric:         Mood and Affect: Mood normal          Behavior: Behavior normal          Thought Content: Thought content normal          Judgment: Judgment normal         18 inch neck circumference  mallampati 4 airway, wide tongue   No tonsillar hypertrophy     Data reviewed-CPAP data, notes from Dr Mia Dsouza

## 2022-09-13 NOTE — PROGRESS NOTES
Review of Systems      Genitourinary need to urinate more than twice a night   Cardiology none   Gastrointestinal none   Neurology difficulty with memory   Constitutional none   Integumentary none   Psychiatry none   Musculoskeletal none   Pulmonary none   ENT none   Endocrine none   Hematological none

## 2022-09-14 ENCOUNTER — TELEPHONE (OUTPATIENT)
Dept: SLEEP CENTER | Facility: CLINIC | Age: 77
End: 2022-09-14

## 2022-09-14 LAB
DME PARACHUTE DELIVERY DATE ACTUAL: NORMAL
DME PARACHUTE DELIVERY DATE REQUESTED: NORMAL
DME PARACHUTE ITEM DESCRIPTION: NORMAL
DME PARACHUTE ORDER STATUS: NORMAL
DME PARACHUTE SUPPLIER NAME: NORMAL
DME PARACHUTE SUPPLIER PHONE: NORMAL

## 2022-09-21 NOTE — PRE-PROCEDURE INSTRUCTIONS
Pre-Surgery Instructions:   Medication Instructions    amLODIPine (NORVASC) 2 5 mg tablet Take day of surgery   atorvastatin (LIPITOR) 10 mg tablet Take night before surgery    candesartan-hydrochlorothiazide (ATACAND HCT) 32-12 5 MG per tablet Hold day of surgery   CIALIS 5 MG tablet Hold day of surgery   co-enzyme Q-10 30 mg Hold day of surgery   cyanocobalamin (VITAMIN B-12) 1000 MCG tablet Hold day of surgery   FERROUS SULFATE PO Hold day of surgery   Glucosamine HCl 1500 MG TABS Hold day of surgery   Krill Oil 500 MG CAPS Hold day of surgery   LACTOBACILLUS ACID-PECTIN PO Hold day of surgery   melatonin 1 mg Take night before surgery    metroNIDAZOLE (METROCREAM) 0 75 % cream Hold day of surgery   Multiple Vitamins-Minerals (MULTIVITAMIN ADULT EXTRA C PO) Hold day of surgery   NAPROXEN PO Hold day of surgery   pantoprazole (PROTONIX) 20 mg tablet Hold day of surgery  Covid screening negative as per patient  Reviewed pre op medicine and showering instructions with patient via phone call, verbalizes understanding  Advised patient to stop taking vitamins, herbal meds and ASA? NSAID's today  Advised may take Tylenol products if needed  Advised to take DOS medicine with a small sip water  Advised NPO after MN prior to surgery and surgical services will call (9/21) with scheduled time of hospital arrival     Pt verbalized understanding of all instructions

## 2022-09-22 ENCOUNTER — HOSPITAL ENCOUNTER (OUTPATIENT)
Facility: AMBULARY SURGERY CENTER | Age: 77
Setting detail: OUTPATIENT SURGERY
Discharge: HOME | End: 2022-09-22
Attending: OTOLARYNGOLOGY | Admitting: OTOLARYNGOLOGY
Payer: COMMERCIAL

## 2022-09-22 ENCOUNTER — ANESTHESIA (OUTPATIENT)
Dept: PERIOP | Facility: AMBULARY SURGERY CENTER | Age: 77
End: 2022-09-22
Payer: COMMERCIAL

## 2022-09-22 ENCOUNTER — ANESTHESIA (OUTPATIENT)
Dept: ANESTHESIOLOGY | Facility: HOSPITAL | Age: 77
End: 2022-09-22

## 2022-09-22 ENCOUNTER — ANESTHESIA EVENT (OUTPATIENT)
Dept: PERIOP | Facility: AMBULARY SURGERY CENTER | Age: 77
End: 2022-09-22
Payer: COMMERCIAL

## 2022-09-22 ENCOUNTER — ANESTHESIA EVENT (OUTPATIENT)
Dept: ANESTHESIOLOGY | Facility: HOSPITAL | Age: 77
End: 2022-09-22

## 2022-09-22 VITALS
BODY MASS INDEX: 33.33 KG/M2 | HEART RATE: 55 BPM | WEIGHT: 225 LBS | OXYGEN SATURATION: 93 % | SYSTOLIC BLOOD PRESSURE: 158 MMHG | HEIGHT: 69 IN | RESPIRATION RATE: 17 BRPM | DIASTOLIC BLOOD PRESSURE: 81 MMHG | TEMPERATURE: 98.1 F

## 2022-09-22 PROCEDURE — 42975 DISE EVAL SLP DO BRTH FLX DX: CPT | Performed by: OTOLARYNGOLOGY

## 2022-09-22 RX ORDER — ACETAMINOPHEN 325 MG/1
650 TABLET ORAL EVERY 6 HOURS PRN
Status: DISCONTINUED | OUTPATIENT
Start: 2022-09-22 | End: 2022-09-22 | Stop reason: HOSPADM

## 2022-09-22 RX ORDER — SODIUM CHLORIDE, SODIUM LACTATE, POTASSIUM CHLORIDE, CALCIUM CHLORIDE 600; 310; 30; 20 MG/100ML; MG/100ML; MG/100ML; MG/100ML
INJECTION, SOLUTION INTRAVENOUS CONTINUOUS PRN
Status: DISCONTINUED | OUTPATIENT
Start: 2022-09-22 | End: 2022-09-22

## 2022-09-22 RX ORDER — GLYCOPYRROLATE 0.2 MG/ML
INJECTION INTRAMUSCULAR; INTRAVENOUS AS NEEDED
Status: DISCONTINUED | OUTPATIENT
Start: 2022-09-22 | End: 2022-09-22

## 2022-09-22 RX ORDER — PROPOFOL 10 MG/ML
INJECTION, EMULSION INTRAVENOUS AS NEEDED
Status: DISCONTINUED | OUTPATIENT
Start: 2022-09-22 | End: 2022-09-22

## 2022-09-22 RX ORDER — PROPOFOL 10 MG/ML
INJECTION, EMULSION INTRAVENOUS CONTINUOUS PRN
Status: DISCONTINUED | OUTPATIENT
Start: 2022-09-22 | End: 2022-09-22

## 2022-09-22 RX ADMIN — PROPOFOL 50 MCG/KG/MIN: 10 INJECTION, EMULSION INTRAVENOUS at 12:19

## 2022-09-22 RX ADMIN — GLYCOPYRROLATE 0.1 MCG: 0.4 INJECTION INTRAMUSCULAR; INTRAVENOUS at 12:16

## 2022-09-22 RX ADMIN — SODIUM CHLORIDE, SODIUM LACTATE, POTASSIUM CHLORIDE, AND CALCIUM CHLORIDE: .6; .31; .03; .02 INJECTION, SOLUTION INTRAVENOUS at 12:05

## 2022-09-22 RX ADMIN — PROPOFOL 50 MG: 10 INJECTION, EMULSION INTRAVENOUS at 12:20

## 2022-09-22 NOTE — DISCHARGE INSTRUCTIONS
Drug Induced Sleep Endoscopy     WHAT YOU SHOULD KNOW:   During a drug induced sleep endsocopy (DISE), your caregiver places a scope into your nose to see inside your nose and throat  You may need a DISE to find the cause of your sleep apnea  DISE helps your caregiver diagnose your condition and create a treatment plan  You might also have surgery or other treatments during a DISE  AFTER YOU LEAVE:     Follow up with your primary healthcare provider or specialist as directed:  Write down your questions so you remember to ask them during your visits  Medicines:   Keep a current list of your medicines:  Include the amounts, and when, how, and why you take them  Take the list or the pill bottles to follow-up visits  Carry your medicine list with you in case of an emergency  Throw away old medicine lists  Use vitamins, herbs, or food supplements only as directed  Take your medicine as directed:  Call your healthcare provider if you think your medicine is not working as expected  Tell him about any medicine allergies, and if you want to quit taking or change your medicine  Nutrition:  Most people eat and drink as usual after a laryngoscopy  Ask your primary healthcare provider if you are not sure  Contact your primary healthcare provider if:  You have problems breathing or talking  You see new injuries to your teeth, lips, or tongue  Your pain does not go away or gets worse  You have questions about your procedure, medicine, or care  If you have any questions or concerns during business hours please call our office at [phone]   After hours please call the surgeon on call or Dr Rae Post at [phone]

## 2022-09-22 NOTE — ANESTHESIA PREPROCEDURE EVALUATION
Procedure:  EXAM UNDER ANESTHESIA (EUA) Drug Induced Sleep Endoscopy (N/A Throat)    Relevant Problems   CARDIO   (+) Essential hypertension   (+) Hyperlipidemia      GI/HEPATIC   (+) GERD (gastroesophageal reflux disease)      HEMATOLOGY   (+) Iron deficiency anemia      MUSCULOSKELETAL   (+) Lumbar spondylosis   (+) Primary generalized (osteo)arthritis   (+) Primary osteoarthritis of both first carpometacarpal joints      NEURO/PSYCH   (+) Chronic pain disorder      PULMONARY   (+) NARCISA (obstructive sleep apnea)    Uses CPAP    Physical Exam    Airway    Mallampati score: II  TM Distance: >3 FB  Neck ROM: full     Dental       Cardiovascular      Pulmonary      Other Findings  Large neck circumfirence      Anesthesia Plan  ASA Score- 3     Anesthesia Type- IV sedation with anesthesia with ASA Monitors  Additional Monitors:   Airway Plan:           Plan Factors-Exercise tolerance (METS): >4 METS  Chart reviewed  Patient summary reviewed  Patient is not a current smoker  Induction- intravenous  Postoperative Plan-     Informed Consent- Anesthetic plan and risks discussed with patient  I personally reviewed this patient with the CRNA  Discussed and agreed on the Anesthesia Plan with the CRNA  Fran Beatty

## 2022-09-22 NOTE — INTERVAL H&P NOTE
H&P reviewed  After examining the patient I find no changes in the patients condition since the H&P had been written      Vitals:    09/22/22 1110   BP: 164/78   Pulse: 62   Resp: 16   Temp: (!) 97 2 °F (36 2 °C)   SpO2: 94%     CTAB  RRR  Abd soft NTND

## 2022-09-22 NOTE — OP NOTE
OPERATIVE REPORT  PATIENT NAME: Shankar Bourgeois    :    MRN: [id number]  Pt Location: [facility] OR [facility]    SURGERY DATE: 2022    Surgeon(s) and Role:     * Josephine Goodpasture, MD - Primary    Preop Diagnosis:  Obstructive sleep apnea (adult) (pediatric) [G47 33]    Post-Op Diagnosis Codes:     * Obstructive sleep apnea (adult) (pediatric) [G47 33]    Procedure(s) (LRB):  EXAM UNDER ANESTHESIA (EUA) Drug Induced Sleep Endoscopy (N/A)    Specimen(s):  * No specimens in log *    Estimated Blood Loss:   Minimal    Drains:  * No LDAs found *    Anesthesia Type:   General    Operative Indications:  Obstructive sleep apnea (adult) (pediatric) [G47 33]  BMI 33    Operative Findings:  V 1 AP  O 1 AP  T 2 AP  E 2 AP    Complications:   None    Procedure and Technique:    PREOPERATIVE DIAGNOSES: Obstructive sleep apnea with positive pressure   intolerance and persistent sleep apnea after CPAP trial    POSTOPERATIVE DIAGNOSES: Same    PROCEDURES: Drug-induced sleep endoscopy (flexible fiberoptic laryngoscopy   with examination under anesthesia)  ANESTHESIA: IV sedation  ESTIMATED BLOOD LOSS: None  COMPLICATIONS: None  INDICATIONS: Shankar Bourgeois is a 69y o  year-old male with a history of symptomatic obstructive sleep apnea, who is intolerant and unable to achieve benefit with positive pressure therapy  He presents today for drug-induced sleep endoscopy to better characterize her locations and pattern of obstruction and to predict appropriate medical and/or surgical options moving forward  FINDINGS: There was no evidence of complete concentric palatal obstruction and he does appear to be a candidate anatomically for hypoglossal nerve   stimulation therapy  DESCRIPTION OF PROCEDURE: Shankar Bourgeois was brought to the operating room and was anesthetized via the standard drug-induced sleep endoscopy protocol   The propofol infusion rate was startedand gradually increased until conditions that mimic sleep were gradually observed  With the patient not responsive to verbal commands, but still with spontaneous respiration, sleep disordered breathing events and associated desaturations were clearly observed    Under these conditions, the flexible endoscope was inserted to examine both sides of the nose as well as the pharynx and larynx  The VOTE score at baseline was V: 1 partial AP; O: 1 partial AP; T: 2 complete AP; E: 2 complete AP  With simulated jaw advancement and tongue advancement, the hypopharyngeal obstruction and secondarily the palatal collapse also improved  In summary, there was no evidence of complete concentric palatal obstruction and he does appear to be a candidate anatomically for hypoglossal nerve stimulation therapy  The patient was then allowed to awaken while monitoring by anesthesia was performed until he was awake and able to maintain his own saturations  The patient was taken to the recovery area in stable condition  All instruments and sponge counts were correct at the end of the procedure  Jacquie Jacobs MD, was present for and performed all key elements of the procedure           I was present for the entire procedure    Patient Disposition:  PACU  and hemodynamically stable        SIGNATURE: [unfilled]  DATE: September 22, 2022  TIME: 12:14 PM

## 2022-11-15 ENCOUNTER — EVALUATION (OUTPATIENT)
Dept: OCCUPATIONAL THERAPY | Age: 77
End: 2022-11-15

## 2022-11-15 DIAGNOSIS — G56.22 CUBITAL TUNNEL SYNDROME ON LEFT: ICD-10-CM

## 2022-11-15 DIAGNOSIS — M19.042 OSTEOARTHRITIS OF BOTH HANDS, UNSPECIFIED OSTEOARTHRITIS TYPE: ICD-10-CM

## 2022-11-15 DIAGNOSIS — M19.041 OSTEOARTHRITIS OF BOTH HANDS, UNSPECIFIED OSTEOARTHRITIS TYPE: ICD-10-CM

## 2022-11-16 NOTE — PROGRESS NOTES
OT Evaluation     Today's date: 11/15/2022  Patient name: Kenyatta Mata  :   MRN: [id number]  Referring provider: Rick Aguero MD  Dx:   Encounter Diagnosis     ICD-10-CM    1  Cubital tunnel syndrome on left  G56 22 Ambulatory Referral to Occupational Therapy   2  Osteoarthritis of both hands, unspecified osteoarthritis type  M19 041 Ambulatory Referral to Occupational Therapy    M19 042                   Assessment  Assessment details: Pt is a 78 y/o male who presents to skilled OT tx with c/o worsening b/l hand and thumb pain (R>L) from arthritis  Pt presents with B/L herbens and devi nodes on digits, as well as R thumb/CMC edema  Pts ROM and strength, noted consistent with last reeval 2022  Pt reports difficulty with ADL tasks such as cooking, writing, and at work pushing a cart  Therapist adjusted pts 2 thumb spica splints to wear as needed and during ADL tasks due to inc edema to R CMC, but also edu onarthritis compression glove and comfort cool CMC restriction splint as well  Therapist fabricated FA CMC brace for nocturnal use 2* reports inc pain, stiffness and sleeping on hand/thumb  Pt edu and provided with HEP of thumb and digit ROM stretch  Pt would benefit from continued skilled OT tx for B/L hand and thumb pain relief, AD education, and arthritis mgmt/edu to inc independence and QOL  Impairments: abnormal coordination, abnormal or restricted ROM, activity intolerance, impaired physical strength, lacks appropriate home exercise program and pain with function    Symptom irritability: lowBarriers to therapy: Work schedule; limited availability   Understanding of Dx/Px/POC: good   Prognosis: good    Goals  STGs:  1  Pt will report dec pain by 50%  2  Pt will be independent with HEP  3  Pt will inc  strength +5 #  LTGs:  1  Pt will report dec pain by 75%  2  Pt will perf and report pain mgmt techniques for arthritis   3   Pt will inc  and pinch strength to WNLs of age group  4  Pt will inc FOTO score    Plan  Patient would benefit from: skilled occupational therapy and custom splinting  Planned modality interventions: fluidotherapy, thermotherapy: hydrocollator packs and thermotherapy: paraffin bath  Planned therapy interventions: manual therapy, massage, joint mobilization, neuromuscular re-education, orthotic management and training, patient education, orthotic fitting/training, compression, self care, strengthening, stretching, therapeutic activities, therapeutic exercise, home exercise program and functional ROM exercises  Frequency: 1x week  Duration in weeks: 4  Treatment plan discussed with: patient        Subjective Evaluation    History of Present Illness  Mechanism of injury: Pt is a 78 y/o male who current c/o worsening B/L hand and thumb pain over the past several months due to arthritis; R>L  Pt c/o moderate pain as well as moderate difficulty with everyday tasks due to arthritis  Recurrent probem    Quality of life: fair    Pain  Current pain ratin  At best pain ratin  At worst pain ratin  Quality: dull ache, tight and pulling  Relieving factors: rest, support and heat  Progression: worsening    Hand dominance: left    Treatments  Current treatment: occupational therapy  Patient Goals  Patient goals for therapy: decreased pain, increased strength, return to work and independence with ADLs/IADLs          Objective     Observations     Left Wrist/Hand   Positive for Sonali's nodes and Heberden's nodes  Right Wrist/Hand   Positive for Sonali's nodes and Heberden's nodes  Additional Observation Details  B/L thumb and digit arthritis, R>L      Neurological Testing     Sensation     Wrist/Hand   Left   Intact: light touch, pin prick and sharp/dull discrimination    Right   Intact: light touch, pin prick and sharp/dull discrimination    Active Range of Motion     Left Wrist   Normal active range of motion    Right Wrist   Normal active range of motion    Left Thumb   Flexion     MP: 35 degrees    DIP: 30 degrees  Palmar Abduction     CMC: 40 degrees  Radial abduction    CMC: 50 degrees    Right Thumb   Flexion     MP: 25    DIP: 35  Palmar Abduction    CMC: 35  Radial Abduction    CMC: 43    Right Digits   Flexion   Index     MCP: 75    PIP: 70    DIP: 60  Middle     MCP: 70    PIP: 70    DIP: 55  Ring     MCP: 75    PIP: 60    DIP: 35  Little     MCP: 75    PIP: 65    DIP: 50  Extension   Little     PIP: -15    Strength/Myotome Testing     Left Wrist/Hand   Wrist extension: 4  Wrist flexion: 4  Radial deviation: 4  Ulnar deviation: 4     (2nd hand position)     Trial 1: 35    Thumb Strength  Key/Lateral Pinch     Trial 1: 18  Tip/Two-Point Pinch     Trial 1: 9  Palmar/Three-Point Pinch     Trial 1: 13    Right Wrist/Hand   Wrist extension: 4  Wrist flexion: 4  Radial deviation: 4  Ulnar deviation: 4     (2nd hand position)     Trial 1: 30    Thumb Strength   Key/Lateral Pinch     Trial 1: 18  Tip/Two-Point Pinch     Trial 1: 6  Palmar/Three-Point Pinch     Trial 1: 9    Tests     Left Wrist/Hand   Positive CMC grind  Right Wrist/Hand   Positive CMC grind  Precautions: Universal  HEP- thumb/CMC stretching (H drive), TGE for digits, thumb spica for nights and work tasks PRN        Manuals             IASTM CMC             PROM thumb             KT             Orthosis anabelle             Neuro Re-Ed             Arthritis edu                                                                                           Ther Ex             TGE             Thumb ROM             CMC ABD rolls on TB             OK pinch                                                                 Ther Activity             FMC/strengthening pinch                          Gait Training                                       Modalities             MP/CP             Paraffin

## 2022-11-29 ENCOUNTER — OFFICE VISIT (OUTPATIENT)
Dept: OCCUPATIONAL THERAPY | Age: 77
End: 2022-11-29

## 2022-11-29 DIAGNOSIS — M19.042 OSTEOARTHRITIS OF BOTH HANDS, UNSPECIFIED OSTEOARTHRITIS TYPE: ICD-10-CM

## 2022-11-29 DIAGNOSIS — M19.041 OSTEOARTHRITIS OF BOTH HANDS, UNSPECIFIED OSTEOARTHRITIS TYPE: ICD-10-CM

## 2022-11-29 DIAGNOSIS — G56.22 CUBITAL TUNNEL SYNDROME ON LEFT: Primary | ICD-10-CM

## 2022-11-30 NOTE — PROGRESS NOTES
Daily Note     Today's date: 2022  Patient name: Chad Hilario  : [birth year]  MRN: [id number]  Referring provider: Domo Dugan MD  Dx:   Encounter Diagnosis     ICD-10-CM    1  Cubital tunnel syndrome on left  G56 22       2  Osteoarthritis of both hands, unspecified osteoarthritis type  M19 041     M19 042                      Subjective: "Now that the splints are better; it is helping more "      Objective: See treatment diary below      Assessment: Tolerated treatment well  Pt reported his phone is is problem  Edu on stands to prop phone up  Also reported driving as a stresser; edu on ergonomic friendly steering wheel cover  Pt noted with dec stiffness and inc ROM after tx  Patient would benefit from continued OT      Plan: Continue per plan of care  Precautions: Universal  HEP- thumb/CMC stretching (H drive), TGE for digits, thumb spica for nights and work tasks PRN        Manuals             IASTM CMC 15' B/L            PROM thumb 5'            KT             Orthosis anabelle             Neuro Re-Ed             Arthritis edu Perf; AD                                                                                          Ther Ex             TGE             Thumb ROM fluido 10'            CMC ABD rolls on TB 30x            OK pinch                                                                 Ther Activity             FMC/strengthening pinch                          Gait Training                                       Modalities             MP/CP             Paraffin

## 2022-12-01 ENCOUNTER — COMPLETE EYE EXAM (OUTPATIENT)
Dept: URBAN - METROPOLITAN AREA CLINIC 6 | Facility: CLINIC | Age: 77
End: 2022-12-01

## 2022-12-01 DIAGNOSIS — H40.012: ICD-10-CM

## 2022-12-01 DIAGNOSIS — H35.371: ICD-10-CM

## 2022-12-01 DIAGNOSIS — Z96.1: ICD-10-CM

## 2022-12-01 PROCEDURE — 92250 FUNDUS PHOTOGRAPHY W/I&R: CPT

## 2022-12-01 PROCEDURE — 92014 COMPRE OPH EXAM EST PT 1/>: CPT

## 2022-12-01 ASSESSMENT — VISUAL ACUITY
OD_CC: 20/40
OS_CC: 20/30

## 2022-12-01 ASSESSMENT — TONOMETRY
OD_IOP_MMHG: 12
OS_IOP_MMHG: 13

## 2022-12-09 ENCOUNTER — IOP CHECK (OUTPATIENT)
Dept: URBAN - METROPOLITAN AREA CLINIC 6 | Facility: CLINIC | Age: 77
End: 2022-12-09

## 2022-12-09 DIAGNOSIS — H40.012: ICD-10-CM

## 2022-12-09 DIAGNOSIS — H35.371: ICD-10-CM

## 2022-12-09 DIAGNOSIS — Z96.1: ICD-10-CM

## 2022-12-09 PROCEDURE — 92012 INTRM OPH EXAM EST PATIENT: CPT

## 2022-12-09 PROCEDURE — 76514 ECHO EXAM OF EYE THICKNESS: CPT

## 2022-12-09 PROCEDURE — 92020 GONIOSCOPY: CPT

## 2022-12-09 PROCEDURE — 92133 CPTRZD OPH DX IMG PST SGM ON: CPT

## 2022-12-09 PROCEDURE — 92083 EXTENDED VISUAL FIELD XM: CPT

## 2022-12-09 ASSESSMENT — VISUAL ACUITY
OS_CC: 20/25-1
OD_CC: 20/50+2
OD_PH: 20/50
OU_CC: J2

## 2022-12-09 ASSESSMENT — PACHYMETRY
OD_CT_UM: 550
OS_CT_UM: 562

## 2022-12-09 ASSESSMENT — TONOMETRY
OS_IOP_MMHG: 14
OD_IOP_MMHG: 14

## 2022-12-13 ENCOUNTER — OFFICE VISIT (OUTPATIENT)
Dept: OCCUPATIONAL THERAPY | Age: 77
End: 2022-12-13

## 2022-12-13 DIAGNOSIS — G56.22 CUBITAL TUNNEL SYNDROME ON LEFT: Primary | ICD-10-CM

## 2022-12-13 DIAGNOSIS — M19.042 OSTEOARTHRITIS OF BOTH HANDS, UNSPECIFIED OSTEOARTHRITIS TYPE: ICD-10-CM

## 2022-12-13 DIAGNOSIS — M19.041 OSTEOARTHRITIS OF BOTH HANDS, UNSPECIFIED OSTEOARTHRITIS TYPE: ICD-10-CM

## 2022-12-13 NOTE — PROGRESS NOTES
Daily Note / D/C Note    Today's date: 2022  Patient name: Claudia Stokes  : [birth year]  MRN: [id number]  Referring provider: Tesha Ely MD  Dx:   Encounter Diagnosis     ICD-10-CM    1  Cubital tunnel syndrome on left  G56 22       2  Osteoarthritis of both hands, unspecified osteoarthritis type  M19 041     M19 042                      Subjective: "My thumb is feeling better but my IF is sore "      Objective: See treatment diary below      Assessment: Tolerated treatment well  Pt reported soreness in thumb and IF 2* stiffness and arthritis in PIP joint  After heat and manual stretch/distraction pt reported no pain in thumb and IF; mild pain in RF PIP joint  Pt has fluctuating arthritis  Pt provided with Living with arthritis AD handout for further edu and joint protection  Pt appreciative and reports he will order items to help  Pt reported he will go back to MD for [facility] for pain mgmt and follow new recommendations and suggestions for living with his arthritis  Plan: D/C to HEP     Precautions: Universal  HEP- thumb/CMC stretching (H drive), TGE for digits, thumb spica for nights and work tasks PRN        Manuals            IASTM CMC 15' B/L 10'           PROM thumb 5' 5' distraction IF 2'           KT             Orthosis anabelle             Neuro Re-Ed             Arthritis edu Perf; AD Living with arthritis guide                                                                                         Ther Ex             TGE             Thumb ROM fluido 10'            CMC ABD rolls on TB 30x 2x15           OK pinch  2x15           Web space stretch  With clothes pin 3 sets 30 sec; 2' self/hand                                                  Ther Activity             FMC/strengthening pinch                          Gait Training                                       Modalities             MP/CP             Paraffin  5'

## 2023-01-03 ENCOUNTER — TELEPHONE (OUTPATIENT)
Dept: SLEEP CENTER | Facility: CLINIC | Age: 78
End: 2023-01-03

## 2023-01-03 NOTE — TELEPHONE ENCOUNTER
----- Message from Judge Goodell sent at 1/3/2023  1:34 PM EST -----  Regarding: [facility]  Patient is scheduled for [facility] on 3/16/23 and will need activation appointment

## 2023-01-26 PROBLEM — M15.2 DEGENERATIVE ARTHRITIS OF PROXIMAL INTERPHALANGEAL JOINT OF INDEX FINGER OF RIGHT HAND: Status: ACTIVE | Noted: 2023-01-26

## 2023-01-26 PROBLEM — M18.11 PRIMARY OSTEOARTHRITIS OF FIRST CARPOMETACARPAL JOINT OF RIGHT HAND: Status: ACTIVE | Noted: 2023-01-26

## 2023-01-26 PROBLEM — M15.4 EROSIVE (OSTEO)ARTHRITIS: Status: ACTIVE | Noted: 2023-01-26

## 2023-01-31 PROBLEM — M77.8 RIGHT SHOULDER TENDINITIS: Status: ACTIVE | Noted: 2023-01-31

## 2023-03-08 NOTE — PRE-PROCEDURE INSTRUCTIONS
Pre-Surgery Instructions:   Medication Instructions   • amLODIPine (NORVASC) 2 5 mg tablet Instructed to take per normal schedule including DOS with sips water   • atorvastatin (LIPITOR) 10 mg tablet Take per normal schedule    • candesartan-hydrochlorothiazide (ATACAND HCT) 32-12 5 MG per tablet Instructed to take per normal schedule except DOS   • Cholecalciferol 2000 units CAPS Instructed to stop all Vitamins and Supplements over the counter from now until after surgery   • CIALIS 5 MG tablet Stop taking 1 day prior to surgery  • co-enzyme Q-10 30 mg Instructed to avoid all Vit/Supp from now until after surgery   • cyanocobalamin (VITAMIN B-12) 1000 MCG tablet Instructed to stop all Vitamins and Supplements over the counter from now until after surgery   • FERROUS SULFATE PO Instructed to take per normal schedule except DOS   • Glucosamine HCl 1500 MG TABS Instructed to stop all Vitamins and Supplements over the counter from now until after surgery   • Urbano Gerson 500 MG CAPS Instructed to stop all Vitamins and Supplements over the counter from now until after surgery   • LACTOBACILLUS ACID-PECTIN PO Instructed to stop all Vitamins and Supplements over the counter from now until after surgery   • melatonin 1 mg Instructed to take per normal schedule   • Multiple Vitamins-Minerals (MULTIVITAMIN ADULT EXTRA C PO) Instructed to stop all Vitamins and Supplements over the counter from now until after surgery   • olmesartan-hydrochlorothiazide (BENICAR HCT) 40-12 5 MG per tablet Instructed to take per normal schedule except DOS   • pantoprazole (PROTONIX) 20 mg tablet Instructed to take per normal schedule including DOS with sips water    Medication instructions for day surgery reviewed  Please use only a sip of water to take your instructed medications  Avoid all over the counter vitamins, supplements and NSAIDS for one week prior to surgery per anesthesia guidelines  Tylenol is ok to take as needed       You will receive a call one business day prior to surgery with an arrival time and hospital directions  If your surgery is scheduled on a Monday, the hospital will be calling you on the Friday prior to your surgery  If you have not heard from anyone by 8pm, please call the hospital supervisor through the hospital  at [phone]  Cayla Woodward [phone])  Do not eat or drink anything after midnight the night before your surgery, including candy, mints, lifesavers, or chewing gum  Do not drink alcohol 24hrs before your surgery  Try not to smoke at least 24hrs before your surgery  Follow the pre surgery showering instructions as listed in the [facility] Surgical Experience Booklet” or otherwise provided by your surgeon's office  Do not shave the surgical area 24 hours before surgery  Do not apply any lotions, creams, including makeup, cologne, deodorant, or perfumes after showering on the day of your surgery  No contact lenses, eye make-up, or artificial eyelashes  Remove nail polish, including gel polish, and any artificial, gel, or acrylic nails if possible  Remove all jewelry including rings and body piercing jewelry  Wear causal clothing that is easy to take on and off  Consider your type of surgery  Keep any valuables, jewelry, piercings at home  Please bring any specially ordered equipment (sling, braces) if indicated  Arrange for a responsible person to drive you to and from the hospital on the day of your surgery  Visitor Guidelines discussed  Call the surgeon's office with any new illnesses, exposures, or additional questions prior to surgery  Please reference your [facility] Surgical Experience Booklet” for additional information to prepare for your upcoming surgery  Pt  Verbalized an understanding of all instructions reviewed and offers no concerns at this time

## 2023-03-16 ENCOUNTER — APPOINTMENT (OUTPATIENT)
Dept: RADIOLOGY | Facility: HOSPITAL | Age: 78
End: 2023-03-16

## 2023-03-16 ENCOUNTER — ANESTHESIA EVENT (OUTPATIENT)
Dept: PERIOP | Facility: HOSPITAL | Age: 78
End: 2023-03-16

## 2023-03-16 ENCOUNTER — ANESTHESIA (OUTPATIENT)
Dept: PERIOP | Facility: HOSPITAL | Age: 78
End: 2023-03-16

## 2023-03-16 ENCOUNTER — HOSPITAL ENCOUNTER (OUTPATIENT)
Facility: HOSPITAL | Age: 78
Setting detail: OUTPATIENT SURGERY
Discharge: HOME | End: 2023-03-16
Attending: OTOLARYNGOLOGY | Admitting: OTOLARYNGOLOGY

## 2023-03-16 VITALS
DIASTOLIC BLOOD PRESSURE: 64 MMHG | RESPIRATION RATE: 18 BRPM | BODY MASS INDEX: 32.44 KG/M2 | WEIGHT: 219 LBS | OXYGEN SATURATION: 96 % | SYSTOLIC BLOOD PRESSURE: 168 MMHG | HEIGHT: 69 IN | TEMPERATURE: 98 F | HEART RATE: 75 BPM

## 2023-03-16 DIAGNOSIS — G47.33 OSA (OBSTRUCTIVE SLEEP APNEA): Primary | ICD-10-CM

## 2023-03-16 DEVICE — THE INSPIRE® RESPIRATORY SENSING LEAD (MODEL 4340) IS DESIGNED TO DETECT RESPIRATORY EFFORT. THE LEAD FEATURES A PRESSURE SENSITIVE MEMBRANE THAT CONVERTS THE MECHANICAL ENERGY OF RESPIRATION INTO AN ELECTRICAL SIGNAL. THE LEAD INCORPORATES A STANDARD CONNECTOR FOR COUPLING TO THE INSPIRE IMPLANTABLE PULSE GENERATOR (IPG) FOR TREATMENT OF OBSTRUCTIVE SLEEP APNEA.
Type: IMPLANTABLE DEVICE | Site: NECK | Status: FUNCTIONAL
Brand: INSPIRE

## 2023-03-16 DEVICE — THE INSPIRE® STIMULATION LEAD (MODEL 4063) IS DESIGNED TO DELIVER STIMULATION TO THE HYPOGLOSSAL NERVE FOR THE TREATMENT OF OBSTRUCTIVE SLEEP APNEA. THE LEAD FEATURES A FLEXIBLE, SELF-SIZING CUFF. ELECTRODES IN THE INNER SURFACE OF THE CUFF DELIVER STIMULATION TO THE NERVE. THE LEAD INCORPORATES A STANDARD CONNECTOR FOR COUPLING TO THE INSPIRE IMPLANTABLE PULSE GENERATOR (IPG).
Type: IMPLANTABLE DEVICE | Site: NECK | Status: FUNCTIONAL
Brand: INSPIRE

## 2023-03-16 DEVICE — THE MODEL 3028 IPG CONTAINS ELECTRONICS AND A BATTERY THAT ARE SEALED INSIDE A TITANIUM CASE.  THE IPG IS IMPLANTED SUBCUTANEOUSLY, BELOW THE CLAVICLE IN THE UPPER CHEST, AND CONNECT TO THE STIMULATION LEAD AND SENSING LEAD.  THE MODEL 3028 DOES NOT CONTAIN ANY SOFTWARE OR FIRMWARE.  ALL FUNCTIONS INCLUDING THE TELEMETRY AND ALGORITHM HAVE BEEN DESIGNED INTO THE HARDWARE OF THE IPG.  THE ALGORITHM SYNCHRONIZES STIMULATION OF THE HYPOGLOSSAL NERVE WITH RESPIRATION SIGNALS.   THE IPG PROCESSES THE SAME DYNAMIC RANGE OF PRESSURE SIGNALS (2-48 CMH2O) IN ORDER TO ACCOUNT FOR PRESSURE READING VARIABILITY.  BASED ON TYPICAL SETTINGS FROM THE STAR PIVOTAL TRIAL, THE LONGEVITY OF THE MODEL 3028’S BATTERY WILL AVERAGE 10 YEARS ALTHOUGH THE DEVICE IS SMALLER THAN THE CURRENTLY APPROVED VERSION (MODEL 3024).  IN ADDITION THE MODEL 3028 IPG WILL ALLOW PATIENTS TO SAFELY UNDERGO MAGNETIC RESONANCE IMAGING (MRI) UNDER SPECIFIED CONDITIONS.
Type: IMPLANTABLE DEVICE | Site: CHEST | Status: FUNCTIONAL
Brand: INSPIRE

## 2023-03-16 RX ORDER — SODIUM CHLORIDE, SODIUM LACTATE, POTASSIUM CHLORIDE, CALCIUM CHLORIDE 600; 310; 30; 20 MG/100ML; MG/100ML; MG/100ML; MG/100ML
75 INJECTION, SOLUTION INTRAVENOUS CONTINUOUS
Status: DISCONTINUED | OUTPATIENT
Start: 2023-03-16 | End: 2023-03-16 | Stop reason: HOSPADM

## 2023-03-16 RX ORDER — DEXAMETHASONE SODIUM PHOSPHATE 10 MG/ML
INJECTION, SOLUTION INTRAMUSCULAR; INTRAVENOUS AS NEEDED
Status: DISCONTINUED | OUTPATIENT
Start: 2023-03-16 | End: 2023-03-16

## 2023-03-16 RX ORDER — GLYCOPYRROLATE 0.2 MG/ML
INJECTION INTRAMUSCULAR; INTRAVENOUS AS NEEDED
Status: DISCONTINUED | OUTPATIENT
Start: 2023-03-16 | End: 2023-03-16

## 2023-03-16 RX ORDER — ONDANSETRON 2 MG/ML
INJECTION INTRAMUSCULAR; INTRAVENOUS AS NEEDED
Status: DISCONTINUED | OUTPATIENT
Start: 2023-03-16 | End: 2023-03-16

## 2023-03-16 RX ORDER — LIDOCAINE HYDROCHLORIDE 10 MG/ML
INJECTION, SOLUTION EPIDURAL; INFILTRATION; INTRACAUDAL; PERINEURAL AS NEEDED
Status: DISCONTINUED | OUTPATIENT
Start: 2023-03-16 | End: 2023-03-16

## 2023-03-16 RX ORDER — OXYCODONE HYDROCHLORIDE 5 MG/1
5 TABLET ORAL EVERY 4 HOURS PRN
Qty: 10 TABLET | Refills: 0 | Status: SHIPPED | OUTPATIENT
Start: 2023-03-16 | End: 2023-03-26

## 2023-03-16 RX ORDER — LIDOCAINE HYDROCHLORIDE AND EPINEPHRINE 10; 10 MG/ML; UG/ML
INJECTION, SOLUTION INFILTRATION; PERINEURAL AS NEEDED
Status: DISCONTINUED | OUTPATIENT
Start: 2023-03-16 | End: 2023-03-16 | Stop reason: HOSPADM

## 2023-03-16 RX ORDER — ONDANSETRON 2 MG/ML
4 INJECTION INTRAMUSCULAR; INTRAVENOUS ONCE AS NEEDED
Status: DISCONTINUED | OUTPATIENT
Start: 2023-03-16 | End: 2023-03-16 | Stop reason: HOSPADM

## 2023-03-16 RX ORDER — SODIUM CHLORIDE, SODIUM LACTATE, POTASSIUM CHLORIDE, CALCIUM CHLORIDE 600; 310; 30; 20 MG/100ML; MG/100ML; MG/100ML; MG/100ML
INJECTION, SOLUTION INTRAVENOUS CONTINUOUS PRN
Status: DISCONTINUED | OUTPATIENT
Start: 2023-03-16 | End: 2023-03-16

## 2023-03-16 RX ORDER — MAGNESIUM HYDROXIDE 1200 MG/15ML
LIQUID ORAL AS NEEDED
Status: DISCONTINUED | OUTPATIENT
Start: 2023-03-16 | End: 2023-03-16 | Stop reason: HOSPADM

## 2023-03-16 RX ORDER — FENTANYL CITRATE 50 UG/ML
INJECTION, SOLUTION INTRAMUSCULAR; INTRAVENOUS AS NEEDED
Status: DISCONTINUED | OUTPATIENT
Start: 2023-03-16 | End: 2023-03-16

## 2023-03-16 RX ORDER — CEFAZOLIN SODIUM 2 G/50ML
2000 SOLUTION INTRAVENOUS ONCE
Status: DISCONTINUED | OUTPATIENT
Start: 2023-03-16 | End: 2023-03-16 | Stop reason: HOSPADM

## 2023-03-16 RX ORDER — OXYCODONE HCL 5 MG/5 ML
5 SOLUTION, ORAL ORAL EVERY 4 HOURS PRN
Status: DISCONTINUED | OUTPATIENT
Start: 2023-03-16 | End: 2023-03-16 | Stop reason: HOSPADM

## 2023-03-16 RX ORDER — FENTANYL CITRATE/PF 50 MCG/ML
50 SYRINGE (ML) INJECTION
Status: COMPLETED | OUTPATIENT
Start: 2023-03-16 | End: 2023-03-16

## 2023-03-16 RX ORDER — PROPOFOL 10 MG/ML
INJECTION, EMULSION INTRAVENOUS AS NEEDED
Status: DISCONTINUED | OUTPATIENT
Start: 2023-03-16 | End: 2023-03-16

## 2023-03-16 RX ORDER — SUCCINYLCHOLINE/SOD CL,ISO/PF 100 MG/5ML
SYRINGE (ML) INTRAVENOUS AS NEEDED
Status: DISCONTINUED | OUTPATIENT
Start: 2023-03-16 | End: 2023-03-16

## 2023-03-16 RX ORDER — OXYCODONE HYDROCHLORIDE 5 MG/1
5 TABLET ORAL EVERY 4 HOURS PRN
Qty: 10 TABLET | Refills: 0 | Status: SHIPPED | OUTPATIENT
Start: 2023-03-16 | End: 2023-03-16 | Stop reason: SDUPTHER

## 2023-03-16 RX ORDER — ACETAMINOPHEN 325 MG/1
650 TABLET ORAL EVERY 6 HOURS PRN
Status: DISCONTINUED | OUTPATIENT
Start: 2023-03-16 | End: 2023-03-16 | Stop reason: HOSPADM

## 2023-03-16 RX ORDER — CEFAZOLIN SODIUM 2 G/50ML
SOLUTION INTRAVENOUS AS NEEDED
Status: DISCONTINUED | OUTPATIENT
Start: 2023-03-16 | End: 2023-03-16

## 2023-03-16 RX ORDER — LABETALOL HYDROCHLORIDE 5 MG/ML
10 INJECTION, SOLUTION INTRAVENOUS
Status: DISCONTINUED | OUTPATIENT
Start: 2023-03-16 | End: 2023-03-16 | Stop reason: HOSPADM

## 2023-03-16 RX ADMIN — LABETALOL HYDROCHLORIDE 10 MG: 5 INJECTION, SOLUTION INTRAVENOUS at 16:09

## 2023-03-16 RX ADMIN — SODIUM CHLORIDE, SODIUM LACTATE, POTASSIUM CHLORIDE, AND CALCIUM CHLORIDE: .6; .31; .03; .02 INJECTION, SOLUTION INTRAVENOUS at 15:17

## 2023-03-16 RX ADMIN — FENTANYL CITRATE 50 MCG: 50 INJECTION, SOLUTION INTRAMUSCULAR; INTRAVENOUS at 12:38

## 2023-03-16 RX ADMIN — DEXAMETHASONE SODIUM PHOSPHATE 10 MG: 10 INJECTION, SOLUTION INTRAMUSCULAR; INTRAVENOUS at 12:39

## 2023-03-16 RX ADMIN — SODIUM CHLORIDE, SODIUM LACTATE, POTASSIUM CHLORIDE, AND CALCIUM CHLORIDE: .6; .31; .03; .02 INJECTION, SOLUTION INTRAVENOUS at 12:22

## 2023-03-16 RX ADMIN — PROPOFOL 150 MG: 10 INJECTION, EMULSION INTRAVENOUS at 12:28

## 2023-03-16 RX ADMIN — LIDOCAINE HYDROCHLORIDE 50 MG: 10 INJECTION, SOLUTION EPIDURAL; INFILTRATION; INTRACAUDAL at 12:27

## 2023-03-16 RX ADMIN — FENTANYL CITRATE 50 MCG: 50 INJECTION INTRAMUSCULAR; INTRAVENOUS at 15:44

## 2023-03-16 RX ADMIN — FENTANYL CITRATE 50 MCG: 50 INJECTION, SOLUTION INTRAMUSCULAR; INTRAVENOUS at 12:26

## 2023-03-16 RX ADMIN — CEFAZOLIN SODIUM 2000 MG: 2 SOLUTION INTRAVENOUS at 12:25

## 2023-03-16 RX ADMIN — ONDANSETRON 4 MG: 2 INJECTION INTRAMUSCULAR; INTRAVENOUS at 12:39

## 2023-03-16 RX ADMIN — GLYCOPYRROLATE 0.1 MG: 0.2 INJECTION INTRAMUSCULAR; INTRAVENOUS at 12:27

## 2023-03-16 RX ADMIN — PROPOFOL 50 MG: 10 INJECTION, EMULSION INTRAVENOUS at 12:37

## 2023-03-16 RX ADMIN — PROPOFOL 40 MG: 10 INJECTION, EMULSION INTRAVENOUS at 13:19

## 2023-03-16 RX ADMIN — FENTANYL CITRATE 50 MCG: 50 INJECTION INTRAMUSCULAR; INTRAVENOUS at 15:35

## 2023-03-16 RX ADMIN — Medication 100 MG: at 12:28

## 2023-03-16 RX ADMIN — OXYCODONE HYDROCHLORIDE 5 MG: 5 SOLUTION ORAL at 16:59

## 2023-03-16 RX ADMIN — REMIFENTANIL HYDROCHLORIDE 0.15 MCG/KG/MIN: 1 INJECTION, POWDER, LYOPHILIZED, FOR SOLUTION INTRAVENOUS at 12:36

## 2023-03-16 NOTE — OP NOTE
OPERATIVE REPORT  PATIENT NAME: Jovita Ovalle    :    MRN: [id number]  Pt Location: AN OR ROOM 03    SURGERY DATE: 3/16/2023    Surgeon(s) and Role:     * Charlotte Martinez MD - Primary  Ruth Lomeli - PAC Assisting    Preop Diagnosis:  Obstructive sleep apnea (adult) (pediatric) [G47 33]    Post-Op Diagnosis Codes:     * Obstructive sleep apnea (adult) (pediatric) [G47 33]    Procedure(s):  INSERTION UPPER AIRWAY STIMULATOR    Specimen(s):  * No specimens in log *    Estimated Blood Loss:   Minimal    Drains:  * No LDAs found *    Anesthesia Type:   General    Operative Indications:  Obstructive sleep apnea (adult) (pediatric) [G47 33]  BMI 32    Operative Findings:  Good stimulation at 0 4 V and no retraction at 0 3 V    Complications:   None    Procedure and Technique:  Indications for procedure: Jovita Ovalle is a 69 y o  male with a history of Moderate to Severe obstructive sleep apnea, who is intolerant and unable to achieve benefit from positive pressure therapy  Patient has passed the clinical, polysomnographic, and endoscopic screening criteria and presents today for the implant, whom I have seen in consultation for the above-listed procedure  After discussion of risks, benefits, and alternatives the patient elected to undergo the procedure and informed consent was obtained  Procedure in detail: The patient was brought back to the operating room laid in the supine position and general endotracheal anesthesia was administered  The patient was positioned appropriately  Appropriate time-out was taken and procedure, sidedness, and marking was confirmed  7 mL of 1% lidocaine with 1:100,000 epinephrine was infiltrated into the marked areas  Prior to prepping and draping, electrodes were placed in the genioglossus and styloglossus muscle and connected to the NIM box for intraoperative nerve monitoring  The patient was prepped and draped in standard fashion       Neuroplasty was performed as follows: The lateral branches to retrusor muscles were identified, and tested intra-operatively using the NIM stimulator  The branches were identified and the inclusion branches were stimulated with both visual and neurostimulator confirmation  The branches were dissected in 360 degrees for 1 5 cm around the TV and C1 branches with care not to include the HG branches  Insertion of an upper airway stimulator was performed as follows: The cuff electrode for the hypoglossal nerve stimulator was placed distally to these branches on the medial nerve branch to the genioglossus muscle  Diagnostic evaluation confirmed activation of the genioglossus nerve, resulting in genioglossal activation and tongue protrusion, confirmed visually  The stimulation electrode was then looped under and secured to the digastric tendon on its lateral surface with the provided anchor  Insertion of a thoracic sensor lead was performed as follows:  A second 5 cm incision was made in the right upper chest approximately 3 cm below the clavicle  Dissection was carried down to the pectoralis muscle  An inferior pocket was created deep to the subcutaneous layer and superficial to the pectoralis muscle  Dissection was carried down through pectoralis muscle using blunt dissection  The interspace between the 2nd and 3rd ribs were exposed  The external oblique muscles were identified, and bluntly dissected, and a tunnel was created between the external and internal intercostals in the 2nd intercostal space just on the superior aspect of the third rib  The pleural respiration sensor was placed into the pocket in the inferior aspect of the intercostal space  The sensor was sutured to the fascia using the provided anchors to maintain the sensor facing the pleural space  The stimulation lead was then tunneled in a subplatysmal plane and brought out into the sub-clavicular pocket       Insertion of an upper airway stimulator was continued as follows: Both the cuff electrode and the respiration sensing lead were connected to the implantable pulse generator  Diagnostic evaluation was run, which confirmed a good respiration sensing signal as well as good tongue protrusion stimulation  The implantable pulse generator was placed in the subclavicular pocket and secured loosely to the pectoralis fascia using 2-0 silk sutures  All the wounds were thoroughly irrigated with irrigation  The wounds were then closed in three layers with deep layers closed with 3-0 Vicryl and the skin closed with 4-0 Monocryl  Wound dressings were placed  The patient was returned to the care of Anesthesia, extubated without difficulty, and taken to the recovery area in stable condition  All instruments and sponge counts were correct at the end of the procedure  Inés Pyle MD, was present for and performed all key elements of the procedure       I was present for the entire procedure, A qualified resident physician was not available and A physician assistant was required during the procedure for retraction tissue handling,dissection and suturing    Patient Disposition:  PACU  and extubated and stable        SIGNATURE: Arslan Malik MD  DATE: March 16, 2023  TIME: 12:32 PM

## 2023-03-16 NOTE — ANESTHESIA PREPROCEDURE EVALUATION
Procedure:  INSERTION UPPER AIRWAY STIMULATOR (Head)    Relevant Problems   CARDIO   (+) Essential hypertension   (+) Hyperlipidemia      GI/HEPATIC   (+) GERD (gastroesophageal reflux disease)      HEMATOLOGY   (+) Iron deficiency anemia      MUSCULOSKELETAL   (+) Erosive (osteo)arthritis   (+) Lumbar spondylosis      NEURO/PSYCH   (+) Chronic pain disorder      PULMONARY   (+) NARCISA (obstructive sleep apnea)      Other   (+) Restless leg syndrome      +CPAP  Physical Exam    Airway  Comment: Short and thick neck  Large tongue  Mallampati score: II  TM Distance: <3 FB  Neck ROM: full     Dental   Comment: Upper and lower partials to be removed prior to going back to the main OR,     Cardiovascular      Pulmonary      Other Findings        Anesthesia Plan  ASA Score- 3     Anesthesia Type- general with ASA Monitors  Additional Monitors:   Airway Plan: ETT  Comment: NPO after MN  Plan Factors-Exercise tolerance (METS): <4 METS  Chart reviewed  Patient summary reviewed  Patient is not a current smoker  Induction- intravenous  Postoperative Plan- Plan for postoperative opioid use  Planned trial extubation    Informed Consent- Anesthetic plan and risks discussed with patient  I personally reviewed this patient with the CRNA  Discussed and agreed on the Anesthesia Plan with the CRNA  Nivia Cushing

## 2023-03-16 NOTE — DISCHARGE INSTR - AVS FIRST PAGE
LE [place] Hypoglossal Nerve Stimulator    Post-Operative Care  Office [phone]  Cell [phone]               At Home (in the days immediately following the procedure):   Try to sleep with your head elevated on 2-3 pillows   Iced packs placed over wound will help reduce swelling  They should be used 20 minutes on/ 20 minutes off while awake for the first full day  Crushed ice in ziplock bags or frozen peas or corn work well  The dressings may be removed 2 days after surgery  After the dressings are removed, the wounds should be cleaned with a Q-tip soaked in hydrogen peroxide mixed 50/50 with water  Apply antibiotic ointment (Bacitracin) or Vaseline to the external incisions 3-4 times per day  Take your medicines as prescribed  REMEMBER:  DO NOT DRIVE WHILE TAKING PAIN MEDICATIONS  You should do neck rolls 10 times clockwise and 10 times counterclockwise directions for 1 week after surgery  You may shower with luke-warm water only after the dressings are removed  You may use ibuprofen (Motrin, Advil) and acetaminophen (Tyelnol) for pain control in addition to any prescribed pain medications  You may get out of bed and go to the bathroom with assistance  Eat light, soft meals as tolerated, avoiding gas-stimulating foods  Follow-up care:   Eat before coming to the office for post-operative visits  Rest for the first week after the procedure, avoiding excessive physical activities, hard chewing, lifting objects over 8 lbs (about the weight of a phone book), or bending over  We request that you do not travel by plane for one week after surgery  Clean the wound at least twice daily using 1/2 hydrogen peroxide 1/2 water solution to remove any crusting (scabbing)  Lubricate your wound after cleaning with Q-tips and antibiotic ointment to soften hardened crusts  Follow-up visits:     At one week, the sutures will be removed; you may drive yourself to this appointment (as long as you are no longer taking prescription/narcotic pain medicines)  After dressing removal, make-up may be worn, avoiding the incision lines  Additional follow-up visits will be scheduled at this time  Note that final results from the incisions may not be apparent until [place] after surgery  Healing Care:   After surgery try not to roll onto the wound while asleep  Clean the external skin gently but thoroughly with soap  The use of alcohol and tobacco products prolong swelling and healing and are best avoided for 2 weeks after surgery  Do not expose your wound to sun for 4-6 weeks after surgery  Use sunscreen (SPF 30 or higher) for 6 months after surgery if sun exposure is absolutely necessary  Avoid any physical exercise that can cause over-heating or over-exertion for two weeks after the surgery  Your nose may be swollen and stuffy for several months  Complete healing may take 12 months  It is to your advantage to return for all postoperative visits so that long-term results may be evaluated  Frequently Asked Questions:  When can I shower and shampoo my hair? You may shower the day after your surgery, BUT KEEP ANY DRESSING DRY  This may mean you wash your face/hair in the sink instead  It is important that you do not use hot water, as this can increase the swelling  Lukewarm water is best       When will the swelling and bruising go away? This usually takes 7-10 days or so, but may take less or more time, depending on the individual     When can I take aspirin? You should not take aspirin for 2 weeks prior to or after surgery  The same is true for vitamin E, ginko, garlic pills, and other “natural” supplements  When can I take ibuprofen? Non-steroidal anti-inflammatory drugs such as advil (ibuprofen), alleve (naproxen), or other similar may be used immediately after surgery as per the guidelines on the package  When can I wear my glasses?    You will be able to wear contact lenses as soon as you feel comfortable  You may wear glasses one to two weeks after surgery, depending on the type of surgery you had  In any case, you must be careful not to place any pressure on the wound  When can I wear makeup? Make-up can be applied after suture removal, but not directly on the incisions until 3 weeks after the procedure  When will I activate my device? We will wait for your healing to finish prior to activation which usually means a few weeks  The plan will be set up at your first follow up appointment at 1 week after the procedure  What about exercise? Please adhere to the following schedule:   Up to week 1 after surgery:  REST! No strenuous exercise  Walking is ok  Week 1-3 after surgery: You may begin light aerobic exercise, but no bending over/straining/lifting weights  You may begin some range of motion exercises of your shoulder  Week 3+: You may begin more strenuous exercise, such as yoga, stretching, bending over, lifting weights  Please remember to start slowly  Week 6+: You may resume contact sports, such as soccer, basketball, etc    POST-OPERATIVE APPOINTMENTS:  1 week:  wound check in the office  1 month: device activation and wound check in the office  3 months: device titration sleep study at [address] [place] [address]  4 months: final wound check in the office  Yearly: device check at office  How to contact us:    Phone: If you have questions or concerns, please call us at [phone] during business hours (8 am to 5 pm)  On nights and weekends, you may page the ENT surgeon on call  at [facility]   In case of emergency, please call [phone]

## 2023-03-16 NOTE — H&P
Tree Mendoza is a 69 y o male who presents for re-evaluation of sleep apnea  Had a DISE showing AP collapse  Past Medical History:   Diagnosis Date   • Colon polyp    • CPAP (continuous positive airway pressure) dependence    • Dry eye syndrome    • GERD (gastroesophageal reflux disease)    • Hyperlipidemia    • Hypertension    • Iron deficiency anemia    • Lumbar spondylosis    • NARCISA on CPAP    • Osteoarthritis        Ht 5' 9" (1 753 m)   Wt 99 3 kg (219 lb)   BMI 32 34 kg/m²       Physical Exam   Constitutional: Oriented to person, place, and time  Well-developed and well-nourished, no apparent distress, non-toxic appearance  Cooperative, able to hear and answer questions without difficulty  Voice: Normal voice quality  Head: Normocephalic, atraumatic  No scars, masses or lesions  Face: Symmetric, no edema, no sinus tenderness  Eyes: Vision grossly intact, extra-ocular movement intact  Ears: External ear normal   Bilateral tympanic membranes are intact with intact normal landmarks  No post-auricular erythema or tenderness  Nose: Septum midline, nares clear  Mucosa moist, turbinates well appearing  No crusting, polyps or discharge evident  Oral cavity: Dentition intact  Mucosa moist, lips normal   Tongue mobile, floor of mouth normal   Hard palate unremarkable  No masses or lesions  Oropharynx: Uvula is midline, soft palate normal   Unremarkable oropharyngeal inlet  Tonsils unremarkable  Posterior pharyngeal wall clear  No masses or lesions  Salivary glands:  Parotid glands and submandibular glands symmetric, no enlargement or tenderness  Neck: Normal laryngeal elevation with swallow  Trachea midline  No masses or lesions  No palpable adenopathy  Thyroid: normal in size, unremarkable without tenderness or palpable nodules  Pulmonary/Chest: Normal effort and rate  No respiratory distress  Musculoskeletal: Normal range of motion  Neurological: Cranial nerves 2-12 intact    Skin: Skin is warm and dry  Psychiatric: Normal mood and affect  CTAB  RRR  Abd soft NTND    A/P: Obstructive sleep apnea: We discussed the nature of obstructive sleep apnea  We discussed the natural history of sleep apnea  We discussed options for management  We discussed non-surgical management including weight loss, mandibular advancement devices, and positive airway pressure therapy including various options  We discussed that he is not tolerating his CPAP and has at least moderate NARCISA with an AHI of 35 and BMI of 32, he would like to move forward with Inspire hypoglossal nerve stimulator  Risks, benefits, and alternatives were discussed  Will seek his insurance approval and have him return in follow up for any further discussion

## 2023-03-16 NOTE — ANESTHESIA POSTPROCEDURE EVALUATION
Post-Op Assessment Note    CV Status:  Stable  Pain Score: 0    Pain management: adequate  Multimodal analgesia used between 6 hours prior to anesthesia start to PACU discharge    Mental Status:  Awake   Hydration Status:  Stable and euvolemic   PONV Controlled:  None   Airway Patency:  Patent       Staff: CRNA         No notable events documented      BP   147/90   Temp  97 1   Pulse  77   Resp   18   SpO2   99

## 2023-03-31 ENCOUNTER — ESTABLISHED COMPREHENSIVE EXAM (OUTPATIENT)
Dept: URBAN - METROPOLITAN AREA CLINIC 6 | Facility: CLINIC | Age: 78
End: 2023-03-31

## 2023-03-31 DIAGNOSIS — H18.513: ICD-10-CM

## 2023-03-31 DIAGNOSIS — H35.371: ICD-10-CM

## 2023-03-31 DIAGNOSIS — Z96.1: ICD-10-CM

## 2023-03-31 DIAGNOSIS — H40.012: ICD-10-CM

## 2023-03-31 PROCEDURE — 92133 CPTRZD OPH DX IMG PST SGM ON: CPT

## 2023-03-31 PROCEDURE — 92014 COMPRE OPH EXAM EST PT 1/>: CPT

## 2023-03-31 ASSESSMENT — VISUAL ACUITY
OS_CC: 20/25
OD_CC: 20/30-2

## 2023-03-31 ASSESSMENT — TONOMETRY
OS_IOP_MMHG: 13
OD_IOP_MMHG: 13

## 2023-04-18 ENCOUNTER — OFFICE VISIT (OUTPATIENT)
Dept: SLEEP CENTER | Facility: CLINIC | Age: 78
End: 2023-04-18

## 2023-04-18 VITALS
DIASTOLIC BLOOD PRESSURE: 71 MMHG | SYSTOLIC BLOOD PRESSURE: 159 MMHG | OXYGEN SATURATION: 91 % | BODY MASS INDEX: 33.83 KG/M2 | HEART RATE: 65 BPM | HEIGHT: 69 IN | WEIGHT: 228.4 LBS

## 2023-04-18 DIAGNOSIS — Z96.82 S/P INSERTION OF HYPOGLOSSAL NERVE STIMULATOR: ICD-10-CM

## 2023-04-18 DIAGNOSIS — G47.33 OSA (OBSTRUCTIVE SLEEP APNEA): Primary | ICD-10-CM

## 2023-04-18 DIAGNOSIS — F10.90 HEAVY ALCOHOL CONSUMPTION: ICD-10-CM

## 2023-04-18 NOTE — PATIENT INSTRUCTIONS
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  This is very important as there is a risk of drowsiness with driving  We will plan for a home sleep test in 6 weeks and I will see you after to assess the readings from the inspire device  If the readings are good I can clear you to work  It could take 3 months to achieve optimal results but hopefully we find this sooner  We will plan for a home sleep test in 6 weeks  Please be certain to use inspire the night of that test     It is very important to avoid driving while drowsy, this can be very dangerous or even cause serious injury or death  If sleepy, it is not safe to get behind the wheel  If you are driving and feels sleepy, it is very important to pull over right away  Even losing control of the car for a split second can be deadly  If you feel you cannot control when sleepiness occurs and cannot prevented, it is important to not drive at all until this improves  Please let me know if you experience this as it is very important  Each week on Tuesday, increase the Inspire remote setting by one level   You are on Level three today  One month from now- my nurses will check in with you on the phone to make sure you are doing well  Two months from now- follow-up with me in the office  Three months from- anticipated sleep study in the sleep lab in [facility] to assess efficacy of the device     Please bring your remote to each visit with me    If you have device questions in the middle of the night, please call Inspire support  ([phone])     For problems using Inspire, sleep issues, etc, please contact me in the office in the day

## 2023-04-18 NOTE — PROGRESS NOTES
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  I advised that he should take a leave from work until we know his inspire device is effective  His wife agrees with this and the patient also agreed  I wrote him a letter to present to his employer  I will plan for a home sleep test in 6 weeks and see him thereafter  If his numbers look good he is cleared to return to work  We discussed that the typical timeline is 3 months so there is a chance he may need a longer time frame  He drinks alcohol 4-5 drinks a night  I advised that this is excessive and can affect sleep quality and contribute to tiredness  It also may affect his sleep apnea and cause suboptimal results with inspire  I advised that he should not stop alcohol abruptly due to the risk of withdrawal   I advised that he should try to reduce the amount he drinks by 1 drink a night each month until he is no longer drinking or having only 1 drink a night  We discussed that regardless, alcohol at that quantity is a potential cancer risk and also has negative health consequences  Inspire Settings  Outgoing Settings  Electrode configuration A + - +   Amplitude (V) 1 0  Range (V) 0 8 to 1 7  Pulse width (us) 90  Rate (Hz) 33  Start delay (min) 30  Pause Time (min) 15  Therapy Duration (hr) 10       Subjective:      Patient ID: Katy Thomas is a 69 y o  male  HPI    This is a 29-year-old male who presents in follow-up    He had "initially seen me in September-at that time we discussed a history of obstructive sleep apnea, he had interest in a hypoglossal nerve stimulator  Most recent sleep study showed a mean respiratory vent index of 30 on a home sleep test in April 2022  He had implantation of the inspire device on March 16 of this year  Operative notes document good stimulation at 0 4 V and no retraction at 0 3 V  A postoperative follow-up was completed in approximate 1 week later, he was noted to be healing well  The patient feels there is a stitch sticking out at his chest   Has not had problems with chewing, swallowing, and slurring of speech  He goes to bed 10 pm  Falls asleep in 20-30 minutes  Wakes 2-3 times, returns to sleep quickly  Wakes 630 AM during the week, 9 AM on weekends    No sleepiness in the day, still uses it  Caffeine- 2 cups a day  Alcohol- 4-5 drinks a night     Dreamstation 2 set at 16-18 cm h20      [place] Sleepiness Scale  Sitting and reading: Would never doze  Watching TV: Would never doze  Sitting, inactive in a public place (e g  a theatre or a meeting):  Would never doze  As a passenger in a car for an hour without a break: Would never doze  Lying down to rest in the afternoon when circumstances permit: Would never doze  Sitting and talking to someone: Would never doze  Sitting quietly after a lunch without alcohol: Would never doze  In a car, while stopped for a few minutes in traffic: Would never doze  Total score: 0      Review of Systems    As above  Objective:      /71 (BP Location: Left arm, Patient Position: Sitting, Cuff Size: Adult)   Pulse 65   Ht 5' 9\" (1 753 m)   Wt 104 kg (228 lb 6 4 oz)   SpO2 91%   BMI 33 73 kg/m²          Physical Exam    Tongue midline, has full ROM  No dysarthria  No facial weakness    "

## 2023-04-23 PROBLEM — F10.90 HEAVY ALCOHOL CONSUMPTION: Status: ACTIVE | Noted: 2023-04-23

## 2023-05-02 ENCOUNTER — TELEPHONE (OUTPATIENT)
Dept: SLEEP CENTER | Facility: CLINIC | Age: 78
End: 2023-05-02

## 2023-05-02 NOTE — TELEPHONE ENCOUNTER
Patient called stating he wants a letter to return to work  Per your note on 4/18/2023   plan for a home sleep test in 6 weeks and see him thereafter  If his numbers look good he is cleared to return to work        Dr Tiffany Barrios please advise

## 2023-05-04 ENCOUNTER — TELEPHONE (OUTPATIENT)
Dept: SLEEP CENTER | Facility: CLINIC | Age: 78
End: 2023-05-04

## 2023-05-04 NOTE — TELEPHONE ENCOUNTER
He has been sleeping well with Inspire ,no difficulty  Up to level 6,   Last night slept 8 hours  Not napping, not dozing at all   No drowsy driving     He wants to get clearance to return to work  I advised that we need to do a home sleep test demonstrating less than 15 times per hour of sleep apnea  He already is scheduled in early June but wishes to move the test up a week  I discussed that I am open to this but the sooner we do the test, the less likely it is we will obtain a good result with inspire  I will talk to my staff and see if we can move up the test by 1 week  After that I will then determine 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

## 2023-05-04 NOTE — TELEPHONE ENCOUNTER
Patient left second message stating he wants a letter saying he can return to work sooner  He has been sleeping great with the Inspire device  Dr Darya Briones, please advise

## 2023-05-05 NOTE — TELEPHONE ENCOUNTER
Called patient to try to reschedule home sleep study 1 week earlier  Only appointment available the week prior is Friday 6/2/23  Offered to reschedule but patient declined  Prefers to keep study scheduled as is on 6/5/23

## 2023-05-18 ENCOUNTER — TELEPHONE (OUTPATIENT)
Dept: SLEEP CENTER | Facility: CLINIC | Age: 78
End: 2023-05-18

## 2023-06-01 ENCOUNTER — TELEPHONE (OUTPATIENT)
Dept: SLEEP CENTER | Facility: CLINIC | Age: 78
End: 2023-06-01

## 2023-06-01 NOTE — TELEPHONE ENCOUNTER
I called the patient to follow-up, he feels that things are going well with inspire  He wakes a few times during the night but notes that was the case with CPAP as well  He denies daytime sleepiness  He notes he still does snore with inspire, did not snore with CPAP    Presently he is on level 5 with inspire  I advised that likely this level would not be very effective and he needs to increase his settings  He had been on level 6 recently for an extended period of time  I recommended that he increase to level 6 tonight and then if that is well-tolerated, increased to level 7 the night before the home sleep test   We discussed that if he does not have a good result on the home sleep test I will have a hard time clearing him to return to work and therefore higher levels are likely more effective    That said, he should not increase his setting on the night of his test

## 2023-06-01 NOTE — TELEPHONE ENCOUNTER
Patient left message on the nurse line stating that he is scheduled to  equipment for a home sleep study 6/5/2023. Also requested a "Return to Work" letter be provided. Returned call from patient. Advised patient is scheduled to  home sleep study equipment 6/5/2023 at 19:30 from the [facility] sleep lab. Per chart review, Dr. Nolan Code note dated 5/4/2023 at 16:19:  He wants to get clearance to return to work. I advised that we need to do a home sleep test demonstrating less than 15 times per hour of sleep apnea. He already is scheduled in early June but wishes to move the test up a week. I discussed that I am open to this but the sooner we do the test, the less likely it is we will obtain a good result with inspire. I will talk to my staff and see if we can move up the test by 1 week. 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 Advised patient once the test results are available, Dr. 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 Patient stated he is already scheduled to return to work 6/8/2023 and is requesting an expedited interpretation of the study by Dr. Jovita Nieves. Dr. Jovita Nieves notified.

## 2023-06-05 ENCOUNTER — HOSPITAL ENCOUNTER (OUTPATIENT)
Dept: SLEEP CENTER | Facility: CLINIC | Age: 78
Discharge: HOME | End: 2023-06-05
Payer: COMMERCIAL

## 2023-06-05 DIAGNOSIS — G47.33 OSA (OBSTRUCTIVE SLEEP APNEA): ICD-10-CM

## 2023-06-05 DIAGNOSIS — Z96.82 S/P INSERTION OF HYPOGLOSSAL NERVE STIMULATOR: ICD-10-CM

## 2023-06-05 PROCEDURE — G0399 HOME SLEEP TEST/TYPE 3 PORTA: HCPCS

## 2023-06-06 NOTE — PROGRESS NOTES
Home Sleep Study Documentation    HOME STUDY DEVICE: Noxturnal yes                                           Erma G3 no      Pre-Sleep Home Study:    Set-up and instructions performed by: ARIN Tomas     Technician performed demonstration for Patient: yes    Return demonstration performed by Patient: yes    Written instructions provided to Patient: yes    Patient signed consent form: yes        Post-Sleep Home Study:    Additional comments by Patient:           Home Sleep Study Failed:pending    Failure reason: pending    Reported or Detected: pending    Scored by: pending

## 2023-06-07 ENCOUNTER — TELEPHONE (OUTPATIENT)
Dept: SLEEP CENTER | Facility: CLINIC | Age: 78
End: 2023-06-07

## 2023-06-07 NOTE — TELEPHONE ENCOUNTER
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  Works 2-3 days a week     We discussed his Britt Astorga study was not ideal  It was technically limited but he had an respiratory event index of at least 13 (probably underestimated by that result)   In addition hypoxemia was observed  We discussed that he did well with CPAP prior to the use of inspire  If he uses CPAP the night prior to working and is vigilant to avoid drowsy driving, he has my clearance to resume work  He understands he should not use inspire prior to a work night  On the other nights he will use inspire and try to increase it, going up by 1 level per week  I asked him to bring his CPAP machine and inspire to the next visit with me

## 2023-06-07 NOTE — TELEPHONE ENCOUNTER
Returned the patient's call  He reports that his six weeks was up on June 6th and he and his employer expect him back to work tomorrow  He is asking for the results of his sleep study and a letter to go back to work       ( As an aside patient stated that if he needed to he would wear the equipment tonight so he can work tomorrow )     Advised the patient I do not have sleep study results yet and I will send a message to Dr Melita Carl

## 2023-06-08 PROCEDURE — 95806 SLEEP STUDY UNATT&RESP EFFT: CPT | Performed by: PSYCHIATRY & NEUROLOGY

## 2023-09-13 ENCOUNTER — OFFICE VISIT (OUTPATIENT)
Dept: SLEEP CENTER | Facility: CLINIC | Age: 78
End: 2023-09-13
Payer: COMMERCIAL

## 2023-09-13 VITALS
DIASTOLIC BLOOD PRESSURE: 68 MMHG | SYSTOLIC BLOOD PRESSURE: 132 MMHG | HEIGHT: 69 IN | BODY MASS INDEX: 32.14 KG/M2 | WEIGHT: 217 LBS

## 2023-09-13 DIAGNOSIS — G47.33 OSA (OBSTRUCTIVE SLEEP APNEA): Primary | ICD-10-CM

## 2023-09-13 DIAGNOSIS — Z96.82 S/P INSERTION OF HYPOGLOSSAL NERVE STIMULATOR: ICD-10-CM

## 2023-09-13 PROCEDURE — 99214 OFFICE O/P EST MOD 30 MIN: CPT | Performed by: INTERNAL MEDICINE

## 2023-09-13 RX ORDER — SENNOSIDES 8.6 MG
650 CAPSULE ORAL EVERY 8 HOURS PRN
COMMUNITY

## 2023-09-13 RX ORDER — SILDENAFIL 100 MG/1
TABLET, FILM COATED ORAL
COMMUNITY
Start: 2023-07-22

## 2023-09-13 NOTE — PROGRESS NOTES
Progress Note - Sleep Medicine  Merlin Caren Rae 69 y.o. male MRN: [id number]       Impression & Plan:   Patient is a 67yo M with PMH including GERD, HTN, OA, chronic pain disorder, iron deficiency anemia. He is currently utilizing both Inspire device as well as CPAP, he is using CPAP for approximately 2-4 hours per night because he complains of frequent nighttime awakenings and snoring with Inspire device alone. At this time, the goal is to uptitrate the Inpsire and downtitrate the CPAP. CPAP compliance report is not available at this time. We will be using CPAP pressure as a gauge to assess if titrating up on the Inspire voltages will be effective. Patient is to go from level 5 to 6 on Inspire tonight. Will follow up with patient in approximately 6 weeks. 1. NARCISA (obstructive sleep apnea)  - PAP DME Resupply/Reorder    2. S/P insertion of hypoglossal nerve stimulator   ______________________________________________________________________    HPI:    Dell Martinez is a 69 y.o. male with PMH including GERD, HTN, OA, chronic pain disorder, iron deficiency anemia. He presents today for follow up of NARCISA. Patient is s/p Inspire hypoglossal nerve placement device completed on 3/16/2023. He starts the night sleeping with Inspire device on, however he complains of frequent nighttime awakenings. He is unable to fall back asleep unless he is wearing CPAP device. At some point during the night, he will turn his Inspire off and start CPAP therapy and reports being able to sleep the rest of the night. He reports feeling much better with the CPAP device on. Without CPAP, he complains of mouth dryness, excessive snoring, and he believes he wakes himself up by snoring. He is using his CPAP device every night for approximately 4 hours. He denies any issues with the CPAP device, no mask leakage, skin irritation, pain/discomfort, aerophagia, pressure intolerance.      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 Denies driving while drowsy. [place]: 0/24  Sitting and reading: Would never doze  Watching TV: Would never doze  Sitting, inactive in a public place (e.g. a theatre or a meeting): Would never doze  As a passenger in a car for an hour without a break: Would never doze  Lying down to rest in the afternoon when circumstances permit: Would never doze  Sitting and talking to someone: Would never doze  Sitting quietly after a lunch without alcohol: Would never doze  In a car, while stopped for a few minutes in traffic: Would never doze  Total score: 0     Social history updates:  Social History     Tobacco Use   Smoking Status Former   • Packs/day: 0.50   • Years: 35.00   • Total pack years: 17.50   • Types: Cigarettes   • Start date: 5/15/1963   • Quit date: 1999   • Years since quittin.8   Smokeless Tobacco Never     Social History     Socioeconomic History   • Marital status: /Civil Union     Spouse name: Not on file   • Number of children: Not on file   • Years of education: Not on file   • Highest education level: Not on file   Occupational History   • Not on file   Tobacco Use   • Smoking status: Former     Packs/day: 0.50     Years: 35.00     Total pack years: 17.50     Types: Cigarettes     Start date: 5/15/1963     Quit date: 1999     Years since quittin.8   • Smokeless tobacco: Never   Vaping Use   • Vaping Use: Never used   Substance and Sexual Activity   • Alcohol use:  Yes     Alcohol/week: 30.0 standard drinks of alcohol     Types: 10 Glasses of wine, 20 Cans of beer per week     Comment: 3-4 per day   • Drug use: Never   • Sexual activity: Yes     Partners: Female   Other Topics Concern   • Not on file   Social History Narrative   • Not on file     Social Determinants of Health     Financial Resource Strain: Not on file   Food Insecurity: Not on file   Transportation Needs: Not on file   Physical Activity: Not on file   Stress: Not on file   Social Connections: Not on file   Intimate Partner Violence: Not on file   Housing Stability: Not on file       PhysicalExamination:  Vitals:   /68 (BP Location: Left arm, Patient Position: Sitting, Cuff Size: Large)   Ht 5' 9" (1.753 m)   Wt 98.4 kg (217 lb)   BMI 32.05 kg/m²     Physical Exam:  General: Sitting in chair, awake alert and oriented to person, place, and time. No acute distress  HEENT: PERRL, nares patent, no craniofacial abnormalities. Mucous membranes, moist, no oral lesions, and normal dentition. Mallampati class IV, tonsils 1+  NECK:  Trachea midline, no accessory muscle use, and no stridor   CARDIAC: Regular rate and rhythm  PULM: CTA bilaterally no wheezing, rhonchi or rales. No conversational dyspnea  EXT: No cyanosis, no clubbing, and no peripheral edema    NEURO: No focal neurologic deficits, moving all extremities appropriately    Diagnostic Data:  Labs: I personally reviewed the most recent laboratory data pertinent to today's visit     No results found for: "WBC", "HGB", "HCT", "MCV", "PLT"  No results found for: "GLUCOSE", "CALCIUM", "NA", "K", "CO2", "CL", "BUN", "CREATININE"  No results found for: "IGE"  No results found for: "ALT", "AST", "GGT", "ALKPHOS", "BILITOT"  No results found for: "IRON", "TIBC", "FERRITIN"  No results found for: "Fern Karmen"  No results found for: "FOLATE"      Arterial Blood Gas result:  N/A    Sleep studies:  HST:  IMPRESSION:  1.  This was an ineffective Inspire home sleep test.  With the Inspire device, residual mild obstructive sleep apnea was present with a NISREEN of 13.7 However, due to technical limitations and limitations in general due to home sleep testing, it is felt that this result underestimates sleep apnea severity. Significant snoring was present.   2. Intermittent baseline hypoxemia was noted.    3. Average heart rate was normal     RECOMMENDATION:  This test demonstrates that the current settings are not effective, a change in settings is recommended Further increases are recommended using the current electrode configuration, with a fine-tune sleep study in the future.     Compliance Data: Not available       [id number] [facility]  Sleep Medicine Fellow

## 2023-09-14 ENCOUNTER — TELEPHONE (OUTPATIENT)
Dept: SLEEP CENTER | Facility: CLINIC | Age: 78
End: 2023-09-14

## 2023-09-14 LAB

## 2023-09-27 DIAGNOSIS — G47.33 OSA (OBSTRUCTIVE SLEEP APNEA): Primary | ICD-10-CM

## 2023-09-28 ENCOUNTER — TELEPHONE (OUTPATIENT)
Dept: SLEEP CENTER | Facility: CLINIC | Age: 78
End: 2023-09-28

## 2023-10-06 ENCOUNTER — FOLLOW UP (OUTPATIENT)
Dept: URBAN - METROPOLITAN AREA CLINIC 6 | Facility: CLINIC | Age: 78
End: 2023-10-06

## 2023-10-06 DIAGNOSIS — Z96.1: ICD-10-CM

## 2023-10-06 DIAGNOSIS — H35.371: ICD-10-CM

## 2023-10-06 DIAGNOSIS — H40.012: ICD-10-CM

## 2023-10-06 PROCEDURE — 92134 CPTRZ OPH DX IMG PST SGM RTA: CPT

## 2023-10-06 PROCEDURE — 92083 EXTENDED VISUAL FIELD XM: CPT

## 2023-10-06 PROCEDURE — 92012 INTRM OPH EXAM EST PATIENT: CPT

## 2023-10-06 ASSESSMENT — VISUAL ACUITY
OU_CC: 20/30-1
OS_CC: 20/25-2
OD_CC: 20/50
OU_CC: J1+

## 2023-10-06 ASSESSMENT — TONOMETRY
OS_IOP_MMHG: 15
OS_IOP_MMHG: 16
OD_IOP_MMHG: 10
OD_IOP_MMHG: 14

## 2023-11-15 ENCOUNTER — OFFICE VISIT (OUTPATIENT)
Dept: SLEEP CENTER | Facility: CLINIC | Age: 78
End: 2023-11-15
Payer: COMMERCIAL

## 2023-11-15 VITALS
SYSTOLIC BLOOD PRESSURE: 146 MMHG | WEIGHT: 217 LBS | HEART RATE: 59 BPM | BODY MASS INDEX: 32.14 KG/M2 | DIASTOLIC BLOOD PRESSURE: 71 MMHG | HEIGHT: 69 IN

## 2023-11-15 DIAGNOSIS — G47.33 OSA (OBSTRUCTIVE SLEEP APNEA): ICD-10-CM

## 2023-11-15 DIAGNOSIS — G47.00 INSOMNIA, UNSPECIFIED TYPE: Primary | ICD-10-CM

## 2023-11-15 PROCEDURE — 99214 OFFICE O/P EST MOD 30 MIN: CPT | Performed by: INTERNAL MEDICINE

## 2023-11-15 RX ORDER — CYCLOSPORINE 0.5 MG/ML
EMULSION OPHTHALMIC
COMMUNITY
Start: 2023-10-06

## 2023-11-15 RX ORDER — DOXEPIN HYDROCHLORIDE 10 MG/ML
3 SOLUTION ORAL
Qty: 118 ML | Refills: 0 | Status: SHIPPED | OUTPATIENT
Start: 2023-11-15

## 2023-11-15 NOTE — LETTER
November 16, 2023     Stoney Diaz, [facility] [address] [address]    Patient: Alexander Michael   YOB: 1945   Date of Visit: 11/15/2023       Dear Dr. Eugena Felty: Thank you for referring Virgen Ray to me for evaluation. Below are my notes for this consultation. If you have questions, please do not hesitate to call me. I look forward to following your patient along with you. Sincerely,        Bobbi Barrios DO        CC: No Recipients    Bobbi Barrios DO  11/16/2023 10:44 AM  Sign when Signing Visit  Progress Note - Sleep Medicine  Angelica Rae 69 y.o. male MRN: [id number]       Impression & Plan:   69 y.o. M with PMHx of HTN, GERD, arthritis, iron deficiency anemia, alcohol use who comes in for management of NARCISA. 1.  Moderate to Severe NARCISA(AHI - 17 - 62)  s/p implantation of Inspire and not utilizing both CPAP and Inspire. However, he defaults to PAP usage over inspire. Here for device adjustment. Device voltage range was adjusted 1.1-2.1. Patient is currently on level 6 (1.6 V) as per change per Dr. Mekhi Quintanilla. Usage dropped to 38 hrs from 40 hrs per week with this change. He is using the pause button up to 5 times a night while on Inspire alone. Sleep sync data not available      -  I recommended that he reduced Inspire back to level 5 for better tolerance. He has too many pauses through the night on Inspire. -  I again recommended that he use both Inspire and CPAP together every night and not to split the night. He was not doing this but is willing to do it now. -  I again explained the weaning process during which we will use autoPAP with inspire and attempt to increase inspire and decrease PAP pressures. But first we need consistency of both devices. -  Continues autoPAP 10-20 cc of H2O pressure. His current pressures run 12.5 - 15.4.   We will assess CPAP compliance at next visit to see if the average pressure reduces while using both inspire and CPAP. Follow compliance in 6 weeks. -  Supply order placed but he needs a new machine that is capable of remote management so that easier adjustments can be completed. This was discussed with [facility]. -  He is aware of the risk of leaving sleep apnea untreated including hypertension, heart failure, arrhythmia, MI and stroke. 2.  Sleep maintenance insomnia - we will trial some doxepin to see if that helps some with both maintenance and onset insomnia. He is agreeable to try.      ______________________________________________________________________    HPI:    Diogo Corey presents today for follow-up for his NARCISA. He notes that he is struggling significantly with inspire. He was told to go up one level by Dr. Melissa Nogueira and he has noted significantly less usage of the inspire and more consistently use with CPAP. He feels that his sleep is much more sound on the CPAP. He is struggling to tolerate his Dino [place] and is hitting the pause button often. He denies headaches or dry mouth. Review of Systems:  Review of Systems   Constitutional:  Positive for fatigue. Negative for appetite change. HENT: Negative. Eyes: Negative. Respiratory: Negative. Cardiovascular: Negative. Gastrointestinal: Negative. Endocrine: Negative. Genitourinary: Negative. Musculoskeletal: Negative. Allergic/Immunologic: Negative. Hematological: Negative. Psychiatric/Behavioral: Negative.            Social history updates:  Social History     Tobacco Use   Smoking Status Former   • Packs/day: 0.50   • Years: 35.00   • Total pack years: 17.50   • Types: Cigarettes   • Start date: 5/15/1963   • Quit date: 1999   • Years since quittin.9   Smokeless Tobacco Never     Social History     Socioeconomic History   • Marital status: /Civil Union     Spouse name: Not on file   • Number of children: Not on file   • Years of education: Not on file   • Highest education level: Not on file   Occupational History   • Not on file   Tobacco Use   • Smoking status: Former     Packs/day: 0.50     Years: 35.00     Total pack years: 17.50     Types: Cigarettes     Start date: 5/15/1963     Quit date: 1999     Years since quittin.9   • Smokeless tobacco: Never   Vaping Use   • Vaping Use: Never used   Substance and Sexual Activity   • Alcohol use:  Yes     Alcohol/week: 30.0 standard drinks of alcohol     Types: 10 Glasses of wine, 20 Cans of beer per week     Comment: 3-4 per day   • Drug use: Never   • Sexual activity: Yes     Partners: Female   Other Topics Concern   • Not on file   Social History Narrative   • Not on file     Social Determinants of Health     Financial Resource Strain: Not on file   Food Insecurity: Not on file   Transportation Needs: Not on file   Physical Activity: Not on file   Stress: Not on file   Social Connections: Not on file   Intimate Partner Violence: Not on file   Housing Stability: Not on file       PhysicalExamination:  Vitals:   /71 (BP Location: Left arm, Patient Position: Sitting, Cuff Size: Large)   Pulse 59   Ht 5' 9" (1.753 m)   Wt 98.4 kg (217 lb)   BMI 32.05 kg/m²   General: Pleasant, Awake alert and oriented x 3, conversant without conversational dyspnea, NAD, normal affect  HEENT:  PERRL, Sclera noninjected, nonicteric OU, Nares patent,  no craniofacial abnormalities, Mucous membranes, moist, no oral lesions, normal dentition, Mallampati class 4  NECK: Trachea midline, no accessory muscle use, no stridor, no cervical or supraclavicular adenopathy, JVP not elevated  CARDIAC: Reg, single s1/S2, no m/r/g  PULM: CTA bilaterally no wheezing, rhonchi or rales  ABD: Normoactive bowel sounds, soft nontender, nondistended, no rebound, no rigidity, no guarding  EXT: No cyanosis, no clubbing, no edema, normal capillary refill  NEURO: no focal neurologic deficits, AAOx3, moving all extremities appropriately      Diagnostic Data:  Labs: I personally reviewed the most recent laboratory data pertinent to today's visit  not applicable    I have personally reviewed pertinent lab results. No results found for: "WBC", "HGB", "HCT", "MCV", "PLT"  No results found for: "GLUCOSE", "CALCIUM", "NA", "K", "CO2", "CL", "BUN", "CREATININE"  No results found for: "IGE"  No results found for: "ALT", "AST", "GGT", "ALKPHOS", "BILITOT"  No results found for: "IRON", "TIBC", "FERRITIN"  No results found for: "Carvel Portis"  No results found for: "FOLATE"      Sleep studies:  Inspire HST:  IMPRESSION:   This was an ineffective Inspire home sleep test.  With the Inspire device, residual mild obstructive sleep apnea was present with a NISREEN of 13.7 However, due to technical limitations and limitations in general due to home sleep testing, it is felt that this result underestimates sleep apnea severity. Significant snoring was present. Intermittent baseline hypoxemia was noted. Average heart rate was normal     RECOMMENDATION:  This test demonstrates that the current settings are not effective, a change in settings is recommended Further increases are recommended using the current electrode configuration, with a fine-tune sleep study in the future. Compliance Data: only available on his phone.   Demonstrated 5 pauses while on Inspire                                         Remedios DO Chuck\

## 2023-11-16 NOTE — PROGRESS NOTES
Progress Note - Sleep Medicine  Sanjuana Rae 69 y.o. male MRN: [id number]       Impression & Plan:   69 y.o. M with PMHx of HTN, GERD, arthritis, iron deficiency anemia, alcohol use who comes in for management of NARCISA. 1.  Moderate to Severe NARCISA(AHI - 17 - 62)  s/p implantation of Inspire and not utilizing both CPAP and Inspire. However, he defaults to PAP usage over inspire. Here for device adjustment. Device voltage range was adjusted 1.1-2.1. Patient is currently on level 6 (1.6 V) as per change per Dr. Lety Wilkinson. Usage dropped to 38 hrs from 40 hrs per week with this change. He is using the pause button up to 5 times a night while on Inspire alone. Sleep sync data not available      -  I recommended that he reduced Inspire back to level 5 for better tolerance. He has too many pauses through the night on Inspire. -  I again recommended that he use both Inspire and CPAP together every night and not to split the night. He was not doing this but is willing to do it now. -  I again explained the weaning process during which we will use autoPAP with inspire and attempt to increase inspire and decrease PAP pressures. But first we need consistency of both devices. -  Continues autoPAP 10-20 cc of H2O pressure. His current pressures run 12.5 - 15.4. We will assess CPAP compliance at next visit to see if the average pressure reduces while using both inspire and CPAP. Follow compliance in 6 weeks. -  Supply order placed but he needs a new machine that is capable of remote management so that easier adjustments can be completed. This was discussed with [employer]. -  He is aware of the risk of leaving sleep apnea untreated including hypertension, heart failure, arrhythmia, MI and stroke. 2.  Sleep maintenance insomnia - we will trial some doxepin to see if that helps some with both maintenance and onset insomnia. He is agreeable to try. ______________________________________________________________________    HPI:    Primo Serra presents today for follow-up for his NARCISA. He notes that he is struggling significantly with inspire. He was told to go up one level by Dr. Jaleesa Marcos and he has noted significantly less usage of the inspire and more consistently use with CPAP. He feels that his sleep is much more sound on the CPAP. He is struggling to tolerate his Tere Esteban and is hitting the pause button often. He denies headaches or dry mouth. Review of Systems:  Review of Systems   Constitutional:  Positive for fatigue. Negative for appetite change. HENT: Negative. Eyes: Negative. Respiratory: Negative. Cardiovascular: Negative. Gastrointestinal: Negative. Endocrine: Negative. Genitourinary: Negative. Musculoskeletal: Negative. Allergic/Immunologic: Negative. Hematological: Negative. Psychiatric/Behavioral: Negative. Social history updates:  Social History     Tobacco Use   Smoking Status Former    Packs/day: 0.50    Years: 35.00    Total pack years: 17.50    Types: Cigarettes    Start date: 5/15/1963    Quit date: 1999    Years since quittin.9   Smokeless Tobacco Never     Social History     Socioeconomic History    Marital status: /Civil Union     Spouse name: Not on file    Number of children: Not on file    Years of education: Not on file    Highest education level: Not on file   Occupational History    Not on file   Tobacco Use    Smoking status: Former     Packs/day: 0.50     Years: 35.00     Total pack years: 17.50     Types: Cigarettes     Start date: 5/15/1963     Quit date: 1999     Years since quittin.9    Smokeless tobacco: Never   Vaping Use    Vaping Use: Never used   Substance and Sexual Activity    Alcohol use:  Yes     Alcohol/week: 30.0 standard drinks of alcohol     Types: 10 Glasses of wine, 20 Cans of beer per week     Comment: 3-4 per day Drug use: Never    Sexual activity: Yes     Partners: Female   Other Topics Concern    Not on file   Social History Narrative    Not on file     Social Determinants of Health     Financial Resource Strain: Not on file   Food Insecurity: Not on file   Transportation Needs: Not on file   Physical Activity: Not on file   Stress: Not on file   Social Connections: Not on file   Intimate Partner Violence: Not on file   Housing Stability: Not on file       PhysicalExamination:  Vitals:   /71 (BP Location: Left arm, Patient Position: Sitting, Cuff Size: Large)   Pulse 59   Ht 5' 9" (1.753 m)   Wt 98.4 kg (217 lb)   BMI 32.05 kg/m²   General: Pleasant, Awake alert and oriented x 3, conversant without conversational dyspnea, NAD, normal affect  HEENT:  PERRL, Sclera noninjected, nonicteric OU, Nares patent,  no craniofacial abnormalities, Mucous membranes, moist, no oral lesions, normal dentition, Mallampati class 4  NECK: Trachea midline, no accessory muscle use, no stridor, no cervical or supraclavicular adenopathy, JVP not elevated  CARDIAC: Reg, single s1/S2, no m/r/g  PULM: CTA bilaterally no wheezing, rhonchi or rales  ABD: Normoactive bowel sounds, soft nontender, nondistended, no rebound, no rigidity, no guarding  EXT: No cyanosis, no clubbing, no edema, normal capillary refill  NEURO: no focal neurologic deficits, AAOx3, moving all extremities appropriately      Diagnostic Data:  Labs: I personally reviewed the most recent laboratory data pertinent to today's visit  not applicable    I have personally reviewed pertinent lab results.   No results found for: "WBC", "HGB", "HCT", "MCV", "PLT"  No results found for: "GLUCOSE", "CALCIUM", "NA", "K", "CO2", "CL", "BUN", "CREATININE"  No results found for: "IGE"  No results found for: "ALT", "AST", "GGT", "ALKPHOS", "BILITOT"  No results found for: "IRON", "TIBC", "FERRITIN"  No results found for: "Claudetta Love"  No results found for: "FOLATE"      Sleep studies:  Inspire HST:  IMPRESSION:   This was an ineffective Inspire home sleep test.  With the Inspire device, residual mild obstructive sleep apnea was present with a NISREEN of 13.7 However, due to technical limitations and limitations in general due to home sleep testing, it is felt that this result underestimates sleep apnea severity. Significant snoring was present. Intermittent baseline hypoxemia was noted. Average heart rate was normal     RECOMMENDATION:  This test demonstrates that the current settings are not effective, a change in settings is recommended Further increases are recommended using the current electrode configuration, with a fine-tune sleep study in the future. Compliance Data: only available on his phone.   Demonstrated 5 pauses while on Inspire    Compliance Data:  10/16/23 - 11/14/23                                   Type of CPAP:  autoPAP 10-20, mean 12.6, max 15.4                                   Percent usage: 97%, > 4 hrs 67%                                   Average time used: 5 hrs 34 mins                                   Residual AHI: 2.3                                                                           Ra Rico DO\

## 2023-12-07 DIAGNOSIS — G47.00 INSOMNIA, UNSPECIFIED TYPE: ICD-10-CM

## 2023-12-08 RX ORDER — DOXEPIN HYDROCHLORIDE 10 MG/ML
SOLUTION ORAL
Qty: 118 ML | Refills: 3 | Status: SHIPPED | OUTPATIENT
Start: 2023-12-08

## 2024-02-29 ENCOUNTER — OFFICE VISIT (OUTPATIENT)
Dept: SLEEP CENTER | Facility: CLINIC | Age: 79
End: 2024-02-29

## 2024-02-29 VITALS
BODY MASS INDEX: 33.33 KG/M2 | DIASTOLIC BLOOD PRESSURE: 72 MMHG | HEIGHT: 69 IN | WEIGHT: 225 LBS | SYSTOLIC BLOOD PRESSURE: 132 MMHG

## 2024-02-29 DIAGNOSIS — G47.00 INSOMNIA, UNSPECIFIED TYPE: ICD-10-CM

## 2024-02-29 DIAGNOSIS — Z78.9 ALCOHOL USE: ICD-10-CM

## 2024-02-29 DIAGNOSIS — Z96.82 S/P INSERTION OF HYPOGLOSSAL NERVE STIMULATOR: ICD-10-CM

## 2024-02-29 DIAGNOSIS — G47.33 OBSTRUCTIVE SLEEP APNEA: Primary | ICD-10-CM

## 2024-02-29 RX ORDER — DOXEPIN HYDROCHLORIDE 10 MG/ML
3 SOLUTION ORAL
Qty: 118 ML | Refills: 1 | Status: SHIPPED | OUTPATIENT
Start: 2024-02-29

## 2024-02-29 NOTE — PATIENT INSTRUCTIONS
Each week on Thursday, increase the Inspire remote setting by one level. You are on Level 1 today  If you experience discomfort and this disturbs her sleep or prevents an increase in the levels, please let me know.  If this occurs, a follow-up visit may be needed to change some comfort settings with the inspire device.    Please remember you must turn off (White button) or pause the Inspire device anytime you are eating, drinking, chewing, or swallowing as there is a risk of choking if you do so when the Inspire device is active.     Please bring your remote to each visit with me    If you have device questions in the middle of the night, please call Inspire support  ([phone])     For problems using Inspire, sleep issues, etc, please contact me in the office in the day or send a message via [place]     Work nights- use Inspire and CPAP  Other nights- try to use just Inspire

## 2024-02-29 NOTE — PROGRESS NOTES
Progress Note - Sleep Medicine  Sylvain Rae 78 y.o. male MRN: [id number]       Impression & Plan:   Patient is a 77yo male with PMH including GERD, HTN, OA, chronic pain disorder, iron deficiency anemia, HLD, h/o alcohol use obesity (Body mass index is 33.23 kg/m².) who presents for follow up of obstructive sleep apnea s/p Inspire HGNS placement. He was activated at 1.0V on 4/18/2023. Patient has had difficulties with Inspire since date of implantation (3/16/2023).     Since last office visit, the use of Doxepin has improved sleep latency significantly, however he continues to experience frequent nocturnal awakenings, some of which are attributed to voltage from Inspire HGNS as he feels a pulsation at his tongue and jaw. He currently goes to sleep with Inspire activated, however he wakes up during the night and at that time will put CPAP on and turn Inspire off.  Doxepin was refilled today. He was advised to discontinue use of Melatonin as to avoid potential oversedation.     As he has had discomfort with Inspire settings, all of the pulse width/rate settings were trialed today. It was decided that the 120/33 was the best tolerated pulse width and rate that was tried. Therefore, outgoing settings were adjusted to reflect the following settings:    Inspire Settings  Outgoing Settings  Electrode configuration A + - +   Amplitude (V) 0.  Range (V) 0.9 to 1.7  Pulse width (us)/ Rate (Hz)  120/33  Start delay (min) 30  Pause Time (min) 30  Therapy Duration (hr) 9     He was advised to use Inspire on the nights that he does not work. It is recommended that he continue to use CPAP on nights that he works as to avoid any potential drowsiness the following day (as he is a  by occupation).     He was instructed to increase the Inspire level by 1 level each week on Thursday. He was instructed to call the office if he experiences discomfort or sleep disturbances as a level increases.    I also counseled patient on  decreasing alcohol use as it can worsen obstructive sleep apnea due to upper airway muscle relaxation leading to increased apneic events, this may also worsen snoring. We discussed long term potential health risks/complications from use of alcohol. He was advised to reduce alcohol slowly.    Patient to follow up in office in approximately 1 months. Patient instructed to call office or send appeningt message with any questions or concerns in the interim.      1. Obstructive sleep apnea    2. S/P insertion of hypoglossal nerve stimulator    3. Alcohol use    4. Insomnia, unspecified type  - doxepin (SINEquan) 10 mg/mL solution; Take 0.3 mL (3 mg total) by mouth daily at bedtime  Dispense: 118 mL; Refill: 1    ____________________________________________________________________                                                                               __    HPI:    Sylvain Rae is a 78 y.o. male with PMH including GERD, HTN, OA, chronic pain disorder, iron deficiency anemia, HLD, h/o alcohol use obesity (Body mass index is 33.23 kg/m².) . He presents today for follow up of obstructive sleep apnea s/p Inspire HGNS placement. Patient was last seen in the office on 11/15/2023 with Dr. Reyna.    The patient underwent 2 night HST on 4/10/2022 demonstrating AHI 17.3 on night 1, and AHI 36.9 on night 2. He has a history of CPAP intolerance, and therefore underwent Inspire HGNS implantation, completed on 3/16/2023.     Since last office visit, the patient continues to struggle significantly with Inspire. He is currently utilizing both Inspire and CPAP.    He was also started on Doxepin 3mg after last office visit for sleep onset and maintenance insomnia. He reports that this has been effective in helping him fall asleep faster. Sleep latency prior to Doxepin was 45-60 minutes. He was also drinking wine to help him fall asleep fast at night. Since starting Doxepin, sleep latency is 29 minutes.  He takes the  Doxepin this in addition to Melatonin 5mg.    He initially goes to sleep with Inspire on. He wakes up after 2-2.5 hours due to pulsing sensation in his tongue. His tongue starts to cramp and become somewhat painful. He has tongue irritation on the R side. He has difficulty falling back asleep after Inspire wakes him up.     He then turns Inspire off and will put CPAP on and keeps this on the rest of the night.     He complains that he continues to snore with Inspire. Snoring is only improved with use of CPAP. Sore throat and dry mouth is improved with CPAP (not improved with Inpsire).    He is a  for [employer] and works three days per week.    Sleep Schedule: Patient goes to bed between 10:00PM-11:00PM. Sleep latency is 20 minutes. Wakes up 2x overnight due to Inspire pulse sensation at his tongue. Sometimes wakes up for nocturia 1x per night. Experiences less frequent nocturnal awakenings with CPAP use compared to Inspire. Wakes up at 6:30AM on days that he works ( he works 3 days per week). Wakes up at 9:00AM on days that he does not work. He does not take naps.    He continues to drink 2 glasses of nimco and 1 beer per night 30 minutes before bedtime.     Inspire Settings  Incoming Settings  Electrode configuration A + - +   Amplitude (V) 1.6  Range (V) 1.1 to 2.1  Pulse width (us)/ Rate (Hz)  90/33  Start delay (min) 30  Pause Time (min) 15  Therapy Duration (hr) 10    Social history updates:  Social History     Tobacco Use   Smoking Status Former    Current packs/day: 0.00    Average packs/day: 0.5 packs/day for 36.5 years (18.3 ttl pk-yrs)    Types: Cigarettes    Start date: 5/15/1963    Quit date: 1999    Years since quittin.2   Smokeless Tobacco Never     Social History     Socioeconomic History    Marital status: /Civil Union     Spouse name: Not on file    Number of children: Not on file    Years of education: Not on file    Highest education level: Not on file    Occupational History    Not on file   Tobacco Use    Smoking status: Former     Current packs/day: 0.00     Average packs/day: 0.5 packs/day for 36.5 years (18.3 ttl pk-yrs)     Types: Cigarettes     Start date: 5/15/1963     Quit date: 1999     Years since quittin.2    Smokeless tobacco: Never   Vaping Use    Vaping status: Never Used   Substance and Sexual Activity    Alcohol use: Yes     Alcohol/week: 30.0 standard drinks of alcohol     Types: 10 Glasses of wine, 20 Cans of beer per week     Comment: 3-4 per day    Drug use: Never    Sexual activity: Yes     Partners: Female   Other Topics Concern    Not on file   Social History Narrative    Not on file     Social Determinants of Health     Financial Resource Strain: Patient Declined (2023)    Received from [facility]    Overall Financial Resource Strain (CARDIA)     Difficulty of Paying Living Expenses: Patient declined   Food Insecurity: Patient Declined (2023)    Received from [facility]    Hunger Vital Sign     Worried About Running Out of Food in the Last Year: Patient declined     Ran Out of Food in the Last Year: Patient declined   Transportation Needs: Unknown (2023)    Received from [facility]    PRAPARE - Transportation     Lack of Transportation (Medical): Patient declined     Lack of Transportation (Non-Medical): Not on file   Physical Activity: Not on file   Stress: Stress Concern Present (2023)    Received from [facility]    [employer] [place] [employer] - Occupational Stress Questionnaire     Feeling of Stress : To some extent   Social Connections: Unknown (2023)    Received from [facility]    Social Connection and Isolation Panel [NHANES]     Frequency of Communication with Friends and Family: Patient declined     Frequency of Social Gatherings with Friends and Family: Patient declined     Attends Adventism  "Services: Patient declined     Active Member of Clubs or Organizations: Patient declined     Attends Club or Organization Meetings: Not on file     Marital Status: Patient declined   Intimate Partner Violence: Unknown (12/7/2023)    Received from [facility]    Humiliation, Afraid, Rape, and Kick questionnaire     Fear of Current or Ex-Partner: No     Emotionally Abused: No     Physically Abused: Patient declined     Sexually Abused: Patient declined   Housing Stability: Unknown (12/7/2023)    Received from [facility]    Housing Stability Vital Sign     Unable to Pay for Housing in the Last Year: Patient refused     Number of Places Lived in the Last Year: Not on file     Unstable Housing in the Last Year: Patient refused       PhysicalExamination:  Vitals:   /72 (BP Location: Left arm, Patient Position: Sitting, Cuff Size: Large)   Ht 5' 9\" (1.753 m)   Wt 102 kg (225 lb)   BMI 33.23 kg/m²     Physical Exam:  General: Sitting in chair, awake alert and oriented to person, place, and time. No acute distress  HEENT: PERRL, nares patent, no craniofacial abnormalities. Mucous membranes, moist, no oral lesions, and normal dentition. Mallampati class IV, tongue midline with FROM, no neuropraxia on exam  NECK:  Trachea midline, no accessory muscle use, and no stridor   CARDIAC: Regular rate and rhythm, no murmur   PULM: CTA bilaterally no wheezing, rhonchi or rales. No conversational dyspnea  EXT: No cyanosis, no clubbing, and no peripheral edema    NEURO: No focal neurologic deficits, moving all extremities appropriately    Diagnostic Data:  Labs:  I personally reviewed the most recent laboratory data pertinent to today's visit    No results found for: \"WBC\", \"HGB\", \"HCT\", \"MCV\", \"PLT\"  Lab Results   Component Value Date    CALCIUM 9.5 02/09/2024    K 4.2 02/09/2024    CO2 27 02/09/2024     02/09/2024    BUN 11 02/09/2024    CREATININE 0.81 02/09/2024     No results found " "for: \"IGE\"  Lab Results   Component Value Date    ALT 20 02/09/2024    AST 14 02/09/2024    ALKPHOS 67 02/09/2024     No results found for: \"IRON\", \"TIBC\", \"FERRITIN\"  No results found for: \"FHJQOWBS65\"  No results found for: \"FOLATE\"      Arterial Blood Gas result:  N/A     Sleep studies:    2 night HST 4/10/2022:  AHI 17.3 on night 1, AHI 36.9 on night 2      HST with Inspire 6/6/2023:  IMPRESSION:   This was an ineffective Inspire home sleep test.  With the Inspire device, residual mild obstructive sleep apnea was present with a NISREEN of 13.7 However, due to technical limitations and limitations in general due to home sleep testing, it is felt that this result underestimates sleep apnea severity.  Significant snoring was present.  Intermittent baseline hypoxemia was noted.   Average heart rate was normal     RECOMMENDATION:  This test demonstrates that the current settings are not effective, a change in settings is recommended Further increases are recommended using the current electrode configuration, with a fine-tune sleep study in the future.             Chyna Campbell   [facility] Sleep Medicine Fellow    "

## 2024-02-29 NOTE — PROGRESS NOTES
"Inspire Settings  Outgoing Settings  Electrode configuration A + - +   Amplitude (V) 0.  Range (V) 0.9 to 1.7  Pulse width (us)/ Rate (Hz)  120/33  Start delay (min) 30  Pause Time (min) 30  Therapy Duration (hr) 9       Cc- pulsation in my tongue keeps me up  Snores with Inspire bothers his wife  Everynight tries Inspire and CPAP in combination  Has tried Inspire by itself  Wakes after 2-2.5 hours.  When he goes back to sleep, wakes with an impulse \"that my tongue is forcing strongly\".  It pushes up against my teeth  Switches to CPAP at least 5 nights a week  2 nights a week, just Inspire    Inspire Settings  Incoming Settings  Electrode configuration A + - +   Amplitude (V) 1.6  Range (V) 1.1 to 2.1  Pulse width (us)/ Rate (Hz)  90/33  Start delay (min) 30  Pause Time (min) 15  Therapy Duration (hr) 10    Inspire Usage Data  Used 25 of 30 days, average session 5 hr 11 min, 0.1 pauses per night      90/33  1.6 V- uncomfortable protrusion    120/33  0.8 v- minimal protrusion  0.9 v- protrusion  1.0 V comfortable protrusion  1.1 v- more comfortable than at home, \"a little stronger\", protrusion    120/40  0.8 v-stiffening, no protrusion  1.0--  very rapid, jolted to the R, protrusion, not comfortable    150/33  0.8 slight protrusion, comfortable  1.0 v- protrusion- \"ok\",  a little stronger    60/40  1.6 v- protrusion\"that was ok\"  1.5- protrusion, that was ok, \" a little bit up thhere\"  1.4= more comfortable., I don't think that would wake me up   1.2 v- minimal protrusion    "

## 2024-04-25 ENCOUNTER — OFFICE VISIT (OUTPATIENT)
Dept: SLEEP CENTER | Facility: CLINIC | Age: 79
End: 2024-04-25
Payer: COMMERCIAL

## 2024-04-25 VITALS
HEIGHT: 69 IN | DIASTOLIC BLOOD PRESSURE: 70 MMHG | BODY MASS INDEX: 33.18 KG/M2 | SYSTOLIC BLOOD PRESSURE: 130 MMHG | WEIGHT: 224 LBS

## 2024-04-25 DIAGNOSIS — G47.00 INSOMNIA, UNSPECIFIED TYPE: ICD-10-CM

## 2024-04-25 DIAGNOSIS — F10.90 HEAVY ALCOHOL CONSUMPTION: ICD-10-CM

## 2024-04-25 DIAGNOSIS — Z96.82 S/P INSERTION OF HYPOGLOSSAL NERVE STIMULATOR: ICD-10-CM

## 2024-04-25 DIAGNOSIS — G47.33 OSA (OBSTRUCTIVE SLEEP APNEA): Primary | ICD-10-CM

## 2024-04-25 PROCEDURE — 95976 ALYS SMPL CN NPGT PRGRMG: CPT | Performed by: PSYCHIATRY & NEUROLOGY

## 2024-04-25 PROCEDURE — 99214 OFFICE O/P EST MOD 30 MIN: CPT | Performed by: PSYCHIATRY & NEUROLOGY

## 2024-04-25 RX ORDER — DOXEPIN HYDROCHLORIDE 10 MG/ML
3 SOLUTION ORAL
Qty: 118 ML | Refills: 1 | Status: SHIPPED | OUTPATIENT
Start: 2024-04-25

## 2024-04-25 NOTE — PROGRESS NOTES
Assessment/Plan:    1. NARCISA (obstructive sleep apnea)    2. S/P insertion of hypoglossal nerve stimulator    3. Insomnia, unspecified type  -     doxepin (SINEquan) 10 mg/mL solution; Take 0.3 mL (3 mg total) by mouth daily at bedtime    4. Heavy alcohol consumption      At present, Sylvain continues to struggle with the inspire device.  He overall prefers using CPAP over inspire.  We discussed that a change in settings may improve comfort with inspire.  For example he describes his tongue is pushing against his teeth, I discussed the case with Ronny Jonathon from [employer] and decided to change the electrode configuration.  He did very well with electrode configuration B, did not have discomfort that he previously had with his incoming settings.  He did not feel his tongue pushing against his teeth with this setting and therefore he was sent home at this setting.  I advised that he should try to sleep with out CPAP and use only inspire.  Certainly on days when he is working, he can use both CPAP and inspire in combination.  I advised him to increase his settings by 1 level every 10 days and I would then like to follow-up with him in 1 month.  Ronny will be present at the follow-up visit.    Has had difficulty falling asleep and insomnia in the past.  Doxepin has been helpful.  We discussed his alcohol consumption in detail.  He drinks approximately 6 alcoholic beverages per day, we discussed that this is not a healthy amount, may contribute to residual snoring, and other problems.  He had stopped drinking alcohol when he first started doxepin and noted feeling more rested upon awakening and fewer headaches upon awakening.  I advised that it would be best to slowly taper the amount of alcohol he drinks to the point where he is no longer drinking, he seemed open to this.  I advised against stopping alcohol suddenly due to risk of withdrawal, we reviewed this in detail.  I outlined a plan on his summary to gradually  "decrease his alcohol over the next few months to the point where he is no longer drinking.  I am hopeful that he will follow through with this.  We discussed that if he is not drinking alcohol, he can use doxepin for sleep, this was renewed today.    I asked him to also call me if he struggles with symptoms or has problems.    We again discussed that it is possible we may identify an inspire setting that effectively treat obstructive sleep apnea but he still may have residual snoring.  Certainly alcohol could contribute to this residual snoring so avoidance of alcohol would be important.      Inspire Settings  Outgoing Settings  Electrode configuration B off minus off  Amplitude (V) 0.5  Range (V) 0.4 to 1.3  Pulse width (us)/ Rate (Hz)  90/33  Start delay (min) 30  Pause Time (min) 30  Therapy Duration (hr) 8        Subjective:      Patient ID: Sylvain Rae is a 78 y.o. male.    HPI  Historian- pt and his wife  This is a 78-year-old male who returns for a follow-up visit he has a history of obstructive sleep apnea treated with the inspire device.  He has had difficulty adhering to the inspire device and also uses CPAP as well.    Cc- Not doing too good, \"I need to use the mask or else I can't sleep\"    Has tried CPAP and sleeps well.  With Inspire only, he snores loudly which is disruptive.    With Inspire and CPAP can sleep 8 hours straight, if he wakes he still has to turn off Inspire as he cannot fall back asleep  Inspire- level 4 is ok, level 5 is \"more of an impulse\", was not fully asleep when it reactivated, felt his tongue push against his lower teeth. This is an uncomfortable feeling and therefore this too motivates to use Inspire.    All along, pulsation of tongue was too much     Not sleepy in the day, was never a problem for him      Goes to bed  pm, up 630 am   With cpap wakes 1-2 times and returns to sleep easily  With cpap and inspire, wakes once and then after 30 minutes can sometimes " "fall back asleep but it is uncomfortable  Inspire alone wakes after 2-3 times       Not sleepy in the day.   No dozing    Caffeine- coffee in the AM and sometimes in the afternoon,  12 oz in the afternoon  Alcohol-  6  drinks a night     Inspire Settings  Incoming Settings  Electrode configuration A + - +   Amplitude (V) 1.3  Range (V) 0.9 to 1.7  Pulse width (us)/ Rate (Hz)  120/33  Start delay (min) 30  Pause Time (min) 30  Therapy Duration (hr) 8    Inspire Usage Data  Used 18 of 30 days, average session 5 hr 20 min, 0.2 pauses per night      [place] Sleepiness Scale  Sitting and reading: Would never doze  Watching TV: Would never doze  Sitting, inactive in a public place (e.g. a theatre or a meeting): Would never doze  As a passenger in a car for an hour without a break: Would never doze  Lying down to rest in the afternoon when circumstances permit: Would never doze  Sitting and talking to someone: Would never doze  Sitting quietly after a lunch without alcohol: Would never doze  In a car, while stopped for a few minutes in traffic: Would never doze  Total score: 0      Review of Systems  (As above unless noted)    Objective:    B 90/33  0.4 v- no protrusion, no sensation  0.5- v - protrusion and sensation thershold  0.6 rapid protrusion comfortable    /70   Ht 5' 9\" (1.753 m)   Wt 102 kg (224 lb)   BMI 33.08 kg/m²          Physical Exam    Tongue midline full ROM   "

## 2024-04-25 NOTE — Clinical Note
Just keeping you in the loop.  He is struggling with inspire and greatly prefers CPAP.  I discussed the case with Ronny, change electrode configuration to configuration B, hopefully this will be more comfortable.  He is close to wanting to stop inspire altogether but hopefully this change will allow him to continue.

## 2024-04-25 NOTE — PATIENT INSTRUCTIONS
increase the Inspire remote setting by one level every 10 days You are on Level two today..  There is no benefit in increasing quicker than recommended and if you increase too quickly, that can lead to discomfort.    On the nights before working, use CPAP and Inspire, other nights use only Inspire     Please call me if this is uncomfortable    I would recommend cutting back on alcohol, you should not stop drinking alcohol suddenly due to risk of withdrawal which includes seizures ot death. You should cut back by 1 drink a night each 2  weeks (so 4 drinks a night for the next 2 weeks, then 3 drinks a night for a few weeks then 2 drinks a night for a few weeks, then 1 drinks a night 2-4 weeks then stop        Please remember you must turn off (White button) or pause the Inspire device anytime you are eating, drinking, chewing, or swallowing as there is a risk of choking if you do so when the Inspire device is active.     Please bring your remote to each visit with me    If you have device questions in the middle of the night, please call Inspire support  ([phone])     For problems using Inspire, sleep issues, etc, please contact me in the office in the day     Nursing Support:  When: Monday through Friday 7A-5PM except holidays  Where: Our direct line is [phone].    If you are having a true emergency please call 911.  In the event that the line is busy or it is after hours please leave a voice message and we will return your call.  Please speak clearly, leaving your full name, birth date, best number to reach you and the reason for your call.   Medication refills: We will need the name of the medication, the dosage, the ordering provider, whether you get a 30 or 90 day refill, and the pharmacy name and address.  Medications will be ordered by the provider only.  Nurses cannot call in prescriptions.  Please allow 7 days for medication refills.  Physician requested updates: If your provider requested that you  call with an update after starting medication, please be ready to provide us the medication and dosage, what time you take your medication, the time you attempt to fall asleep, time you fall asleep, when you wake up, and what time you get out of bed.  Sleep Study Results: We will contact you with sleep study results and/or next steps after the physician has reviewed your testing.         It is very important to avoid driving while drowsy, this can be very dangerous or even cause serious injury or death.  If sleepy, it is not safe to get behind the wheel.  If you are driving and feels sleepy, it is very important to pull over right away.  Even losing control of the car for a split second can be deadly.  If you feel you cannot control when sleepiness occurs and cannot prevented, it is important to not drive at all until this improves.  Please let me know if you experience this as it is very important.

## 2024-05-03 ENCOUNTER — ESTABLISHED COMPREHENSIVE EXAM (OUTPATIENT)
Dept: URBAN - METROPOLITAN AREA CLINIC 6 | Facility: CLINIC | Age: 79
End: 2024-05-03

## 2024-05-03 DIAGNOSIS — H01.024: ICD-10-CM

## 2024-05-03 DIAGNOSIS — Z96.1: ICD-10-CM

## 2024-05-03 DIAGNOSIS — H40.012: ICD-10-CM

## 2024-05-03 DIAGNOSIS — H02.885: ICD-10-CM

## 2024-05-03 DIAGNOSIS — H02.882: ICD-10-CM

## 2024-05-03 DIAGNOSIS — H01.021: ICD-10-CM

## 2024-05-03 DIAGNOSIS — H18.513: ICD-10-CM

## 2024-05-03 DIAGNOSIS — H04.123: ICD-10-CM

## 2024-05-03 DIAGNOSIS — H35.371: ICD-10-CM

## 2024-05-03 PROCEDURE — 92133 CPTRZD OPH DX IMG PST SGM ON: CPT

## 2024-05-03 PROCEDURE — 92014 COMPRE OPH EXAM EST PT 1/>: CPT

## 2024-05-03 ASSESSMENT — TONOMETRY
OS_IOP_MMHG: 10
OD_IOP_MMHG: 10

## 2024-05-03 ASSESSMENT — VISUAL ACUITY
OS_CC: 20/25-1
OD_CC: 20/40-1
OU_CC: J1+

## 2024-05-15 ENCOUNTER — OFFICE VISIT (OUTPATIENT)
Age: 79
End: 2024-05-15
Payer: COMMERCIAL

## 2024-05-15 VITALS
HEIGHT: 69 IN | BODY MASS INDEX: 33.41 KG/M2 | TEMPERATURE: 98.2 F | SYSTOLIC BLOOD PRESSURE: 116 MMHG | DIASTOLIC BLOOD PRESSURE: 68 MMHG | HEART RATE: 61 BPM | WEIGHT: 225.6 LBS | OXYGEN SATURATION: 97 %

## 2024-05-15 DIAGNOSIS — D50.8 OTHER IRON DEFICIENCY ANEMIA: ICD-10-CM

## 2024-05-15 DIAGNOSIS — F10.90 HEAVY ALCOHOL CONSUMPTION: ICD-10-CM

## 2024-05-15 DIAGNOSIS — M15.0 PRIMARY GENERALIZED (OSTEO)ARTHRITIS: Primary | ICD-10-CM

## 2024-05-15 PROCEDURE — 99213 OFFICE O/P EST LOW 20 MIN: CPT | Performed by: PHYSICIAN ASSISTANT

## 2024-05-15 NOTE — ASSESSMENT & PLAN NOTE
Osteoarthritis with significant interphalangeal OA.  He has tried sulfasalazine in the past however he was unable to tolerate this.  Overall his symptoms are generally stable at this time.  He has responded well to injections in the past and will call if he needs injections in the future.  He has also done well with occupational therapy.  Continue Tylenol as needed.  Labs per other physicians.  Follow-up in 6 months or sooner if needed.

## 2024-05-15 NOTE — PROGRESS NOTES
Ambulatory Visit  Name: Sylvain Rae      : 1945      MRN: [id number]  Encounter Provider: Donna Page PA-C  Encounter Date: 5/15/2024   Encounter department: [facility] RHEUMATOLOGY [facility]    Assessment & Plan   1. Primary generalized (osteo)arthritis  Assessment & Plan:  Osteoarthritis with significant interphalangeal OA.  He has tried sulfasalazine in the past however he was unable to tolerate this.  Overall his symptoms are generally stable at this time.  He has responded well to injections in the past and will call if he needs injections in the future.  He has also done well with occupational therapy.  Continue Tylenol as needed.  Labs per other physicians.  Follow-up in 6 months or sooner if needed.  2. Other iron deficiency anemia  Assessment & Plan:  History of iron deficiency.  He continues to follow with hematology and received iron infusions.  3. Heavy alcohol consumption  Assessment & Plan:  History of reflux which is generally stable.  He is currently taking pantoprazole.  Recommend avoiding NSAIDs due to GI history.  Follow-up with GI as needed.      History of Present Illness     Sylvain Rae is a 78 y.o. male.   He is here for follow-up of his osteoarthritis with significant interphalangeal OA.  He was previously treated with sulfasalazine in the past however he was unable to tolerate it and discontinued.  He uses Tylenol as needed.  His osteoarthritis symptoms are generally stable.  He does have OA in his first CMC joints.  He had injections done in his right first CMC joint and right index finger PIP joint in 2023 and had excellent relief with this.  He also completed a course of hand therapy.       He has a history of iron deficiency.  He is getting iron infusions and follows with Hematology.  He has a history of GERD and is taking pantoprazole.  He is following with GI.  He follows with ENT and sleep medicine for his sleep apnea.  He had  an Inspire implant placed in March 2023 however he finds his sleep is better with using his CPAP.    Review of Systems   Constitutional:  Positive for fatigue. Negative for chills and fever.   HENT:  Negative for hearing loss, sore throat and tinnitus.    Eyes:  Negative for pain and visual disturbance.        Dry eyes   Respiratory:  Negative for cough and shortness of breath.    Cardiovascular:  Negative for chest pain and palpitations.   Gastrointestinal:  Negative for abdominal pain, nausea and vomiting.   Genitourinary:  Negative for difficulty urinating.   Musculoskeletal:  Positive for arthralgias. Negative for back pain, gait problem, joint swelling, myalgias, neck pain and neck stiffness.   Skin:  Negative for rash.   Neurological:  Negative for dizziness, seizures, weakness, numbness and headaches.   Psychiatric/Behavioral:  Negative for confusion, decreased concentration and sleep disturbance.      Current Outpatient Medications on File Prior to Visit   Medication Sig Dispense Refill    acetaminophen (TYLENOL) 650 mg CR tablet Take 650 mg by mouth every 8 (eight) hours as needed      amLODIPine (NORVASC) 2.5 mg tablet Take 1 tablet by mouth daily      atorvastatin (LIPITOR) 10 mg tablet TAKE 1 TABLET NIGHTLY      CIALIS 5 MG tablet 5 mg daily as needed      co-enzyme Q-10 30 mg Take 30 mg by mouth daily      cyanocobalamin (VITAMIN B-12) 1000 MCG tablet Take 1,000 mcg by mouth daily      doxepin (SINEquan) 10 mg/mL solution Take 0.3 mL (3 mg total) by mouth daily at bedtime 118 mL 1    FERROUS SULFATE PO Take by mouth in the morning      Glucosamine HCl 1500 MG TABS Take 1 tablet by mouth in the morning      Krill Oil 500 MG CAPS Take 500 mg by mouth in the morning      LACTOBACILLUS ACID-PECTIN PO Take 1 tablet by mouth in the morning      metroNIDAZOLE (METROCREAM) 0.75 % cream Apply topically in the morning      Multiple Vitamins-Minerals (MULTIVITAMIN ADULT EXTRA C PO) Take 1 tablet by mouth in the  "morning      NAPROXEN PO Take by mouth as needed      Oral Syringes MISC Use daily at bedtime 1 mL syringe 100 each 3    pantoprazole (PROTONIX) 20 mg tablet Take 20 mg by mouth daily      Restasis 0.05 % ophthalmic emulsion       sildenafil (VIAGRA) 100 mg tablet TAKE 1 TABLET AS NEEDED FOR ERECTILE DYSFUNCTION      candesartan-hydrochlorothiazide (ATACAND HCT) 32-12.5 MG per tablet Take 1 tablet by mouth daily       No current facility-administered medications on file prior to visit.      Objective     /68 (BP Location: Left arm, Patient Position: Sitting, Cuff Size: Adult)   Pulse 61   Temp 98.2 °F (36.8 °C) (Temporal)   Ht 5' 9\" (1.753 m)   Wt 102 kg (225 lb 9.6 oz)   SpO2 97%   BMI 33.32 kg/m²     Physical Exam  Constitutional:       Appearance: Normal appearance.   Cardiovascular:      Rate and Rhythm: Normal rate and regular rhythm.   Pulmonary:      Breath sounds: Normal breath sounds.   Musculoskeletal:      Comments: Decreased lateral flexion neck.  Triggers posterior cervical and shoulder girdle muscles.  Full range of motion bilateral shoulders, elbows, wrists.  Interphalangeal OA changes, scarring 1st CMC joints, Heberden's nodes, Sonali's nodes bilateral hands. Deformities DIP joints.   No synovitis noted.  Full range of motion bilateral hips, knees, ankles.  Patellofemoral crepitus noted bilateral knees.   Skin:     General: Skin is warm and dry.   Neurological:      General: No focal deficit present.      Mental Status: He is alert.       Administrative Statements           "

## 2024-05-15 NOTE — ASSESSMENT & PLAN NOTE
History of reflux which is generally stable.  He is currently taking pantoprazole.  Recommend avoiding NSAIDs due to GI history.  Follow-up with GI as needed.

## 2024-05-30 ENCOUNTER — OFFICE VISIT (OUTPATIENT)
Dept: SLEEP CENTER | Facility: CLINIC | Age: 79
End: 2024-05-30
Payer: COMMERCIAL

## 2024-05-30 VITALS
WEIGHT: 224 LBS | HEART RATE: 65 BPM | DIASTOLIC BLOOD PRESSURE: 70 MMHG | SYSTOLIC BLOOD PRESSURE: 144 MMHG | HEIGHT: 69 IN | BODY MASS INDEX: 33.18 KG/M2

## 2024-05-30 DIAGNOSIS — G47.33 OSA (OBSTRUCTIVE SLEEP APNEA): Primary | ICD-10-CM

## 2024-05-30 DIAGNOSIS — F51.04 CHRONIC INSOMNIA: ICD-10-CM

## 2024-05-30 DIAGNOSIS — Z96.82 S/P INSERTION OF HYPOGLOSSAL NERVE STIMULATOR: ICD-10-CM

## 2024-05-30 DIAGNOSIS — F10.90 HEAVY ALCOHOL CONSUMPTION: ICD-10-CM

## 2024-05-30 PROCEDURE — 99214 OFFICE O/P EST MOD 30 MIN: CPT | Performed by: PSYCHIATRY & NEUROLOGY

## 2024-05-30 NOTE — ASSESSMENT & PLAN NOTE
We discussed that he is getting more comfortable with inspire, the electrode configuration change made in the last visit seems to have been beneficial.  He has not had an in lab fine-tune study, that is needed.  He wishes to defer this until August.  Therefore I will see him back in a follow-up visit prior to that time.  We discussed that prior to an in lab study, he would need to stop CPAP for at least 1 week before him.  I recommended to him that he avoid driving and that week so he can assess how he is feeling with just inspire alone.

## 2024-05-30 NOTE — ASSESSMENT & PLAN NOTE
He reports cutting back on this somewhat since his last visit.  We discussed that he should not stop drinking abruptly.

## 2024-05-30 NOTE — PROGRESS NOTES
Assessment/Plan:    1. NARCISA (obstructive sleep apnea)  Assessment & Plan:  We discussed that he is getting more comfortable with inspire, the electrode configuration change made in the last visit seems to have been beneficial.  He has not had an in lab fine-tune study, that is needed.  He wishes to defer this until August.  Therefore I will see him back in a follow-up visit prior to that time.  We discussed that prior to an in lab study, he would need to stop CPAP for at least 1 week before him.  I recommended to him that he avoid driving and that week so he can assess how he is feeling with just inspire alone.  2. S/P insertion of hypoglossal nerve stimulator  3. Heavy alcohol consumption  Assessment & Plan:  He reports cutting back on this somewhat since his last visit.  We discussed that he should not stop drinking abruptly.  4. Chronic insomnia  Assessment & Plan:  Low-dose doxepin has been helpful for him at night.  Would be ideal to transition to this and ultimately avoid use of alcohol at bedtime.  We discussed previously that he should avoid doxepin and alcohol in combination.      Outgoing settings:  Electrode configuration B off minus off  Amplitude (V) 0.8  Range (V) 0.4 to 1.3  Pulse width (us)/ Rate (Hz)  90/33  Start delay (min) 30  Pause Time (min) 30  Therapy Duration (hr) 8     Subjective:      Patient ID: Sylvain Rae is a 78 y.o. male.    HPI    This is a 78-year-old male who returns in a follow-up visit he has a history of obstructive sleep apnea treated with the inspire device.  At his last visit with me in April 2024, we discussed that he was struggling with the inspire device, felt that he can only sleep well with use of CPAP.  At that visit, I changed him to electrode configuration B.  This is his first follow-up since that time.    He had an AHI of 30 on a home sleep test in April 2022.    A home sleep study after few months of inspire you showed an NISREEN of 13.7 but is felt that result  "greatly underestimated disease severity due to various factors.  He has not had a fine-tune sleep study.    Inspire data-there is no updated information since fourth    Cc- With Inspire, going better  Feels more comfortable. On level 5.  Tongue no longer rubs against teeth. Falls asleep OK but wakes with snoring and a dry mouth.    He will then wake and put CPAP back on   Can now fall back asleep with Inspire on pause.      Most nights uses both Inspire and CPAP  Uses CPAP 7 days a week    Coffee 1-2 a day  Alcohol- 4-5 drinks a night      Goes to bed  pm and wakes at 630 am for work, 830 am for weekends;  wakes 1-2 times a night    Not sleepy in the day     Sleeps ok with doxepin 2 mg and without alcohol      Use 175 hours, 35/week  Inspire Settings  Incoming Settings  Electrode configuration B off minus off  Amplitude (V) 0.8  Range (V) 0.4 to 1.3  Pulse width (us)/ Rate (Hz)  90/33  Start delay (min) 30  Pause Time (min) 30  Therapy Duration (hr) 8       [place] Sleepiness Scale  Sitting and reading: Would never doze  Watching TV: Would never doze  Sitting, inactive in a public place (e.g. a theatre or a meeting): Would never doze  As a passenger in a car for an hour without a break: Would never doze  Lying down to rest in the afternoon when circumstances permit: Would never doze  Sitting and talking to someone: Would never doze  Sitting quietly after a lunch without alcohol: Would never doze  In a car, while stopped for a few minutes in traffic: Would never doze  Total score: 0      Review of Systems  (As above unless noted)    Objective:      /70 (BP Location: Left arm, Patient Position: Sitting, Cuff Size: Large)   Pulse 65   Ht 5' 9\" (1.753 m)   Wt 102 kg (224 lb)   BMI 33.08 kg/m²          Physical Exam      He has good tongue protrusion at the incoming setting  "

## 2024-05-30 NOTE — ASSESSMENT & PLAN NOTE
Low-dose doxepin has been helpful for him at night.  Would be ideal to transition to this and ultimately avoid use of alcohol at bedtime.  We discussed previously that he should avoid doxepin and alcohol in combination.

## 2024-07-29 ENCOUNTER — TELEPHONE (OUTPATIENT)
Dept: SLEEP CENTER | Facility: CLINIC | Age: 79
End: 2024-07-29

## 2024-07-29 DIAGNOSIS — G47.33 OSA (OBSTRUCTIVE SLEEP APNEA): Primary | ICD-10-CM

## 2024-07-29 NOTE — TELEPHONE ENCOUNTER
Call to patient to schedule sleep study for November, unable to schedule as order expires  9/27/2024.  Patient informed will need a new order placed and then we can schedule his sleep study.  Patient states he would prefer a Thursday or Friday night.     Inspire follow up with Dr. Monroy 10/31/2024.

## 2024-07-30 NOTE — TELEPHONE ENCOUNTER
Email sent to sleep  Wan Armenta.  Will need him to open Inspire beds in November.  Awaiting reply.

## 2024-08-02 NOTE — TELEPHONE ENCOUNTER
Per Wan Armenta , Inspire beds are open in [place] Thursday 11/7 and Thursday 11/21.    Called patient and scheduled Inspire sleep study 11/21/24.  Instructions provided.  Instructed to bring Inspire remote to the study.  Patient verbalized understanding.

## 2024-10-31 ENCOUNTER — OFFICE VISIT (OUTPATIENT)
Dept: SLEEP CENTER | Facility: CLINIC | Age: 79
End: 2024-10-31
Payer: COMMERCIAL

## 2024-10-31 VITALS
WEIGHT: 223 LBS | BODY MASS INDEX: 33.03 KG/M2 | SYSTOLIC BLOOD PRESSURE: 134 MMHG | HEIGHT: 69 IN | DIASTOLIC BLOOD PRESSURE: 79 MMHG

## 2024-10-31 DIAGNOSIS — Z96.82 S/P INSERTION OF HYPOGLOSSAL NERVE STIMULATOR: ICD-10-CM

## 2024-10-31 DIAGNOSIS — G47.33 OSA (OBSTRUCTIVE SLEEP APNEA): Primary | ICD-10-CM

## 2024-10-31 DIAGNOSIS — F51.04 CHRONIC INSOMNIA: ICD-10-CM

## 2024-10-31 PROCEDURE — 95977 ALYS CPLX CN NPGT PRGRMG: CPT | Performed by: PSYCHIATRY & NEUROLOGY

## 2024-10-31 PROCEDURE — 99214 OFFICE O/P EST MOD 30 MIN: CPT | Performed by: PSYCHIATRY & NEUROLOGY

## 2024-10-31 NOTE — PROGRESS NOTES
Ambulatory Visit  Name: Sylvain Rae      : 1945      MRN: [id number]  Encounter Provider: Uriel Monroy MD  Encounter Date: 10/31/2024   Encounter department: [facility] SLEEP MEDICINE [place]    Assessment & Plan  NARCISA (obstructive sleep apnea)         S/P insertion of hypoglossal nerve stimulator         Chronic insomnia         We discussed that Pavan continues to struggle to use the inspire device consistently.  We have had similar conversations at previous visits, CPAP seems to work well and best for him and this limits his use of inspire.  Because he does not use inspire enough, he is not able to increase his level to obtain a setting that is efficacious.    We discussed that it is excellent that he is not drinking alcohol anymore, he had improvement with his sleep quality unfortunately still snores a lot.  Certainly weight loss could also be helpful, with avoidance of alcohol, potentially he may lose some weight.    With regard to insomnia symptoms, doxepin seems to be working well and would continue.    Because he is struggling with inspire, I decided to try different settings.  I will refocus on electrode configuration A with the changes noted below.  I advised that he should try to use inspire every night.  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  I would like to follow closely with him and we will see him back in 4 weeks    Inspire Settings  Outgoing Settings  Electrode configuration A + - +   Amplitude (V) 0.9  Range (V) 0.8 to 1.4  Pulse width (us)/ Rate (Hz)  90/33  Start delay (min) 75  Pause Time (min) 30  Therapy Duration (hr) 8       History of Present Illness     HPI          This is a 79-year-old male who returns in a follow-up visit, I last saw him in May 2024.  He has a history of severe obstructive sleep apnea, AHI 30 at baseline.  When I last saw him, he was using inspire 35 hours a  "week, a marked improvement from before.  At a prior visit in April 2024, he had discomfort with inspire and was not able to sleep with it, was therefore still using his CPAP machine.  Various settings were tested in the office and ultimately was decided to switch him to configuration B.  He felt that even with low levels of stimulations, configuration a caused discomfort.    Cc- not sleeping well  Pavan stopped drinking alcohol a month ago  Trying to use doxepin  When Inspire turns on, he feels a constant motion of his tongue  He woke up a few nights ago with a dry mouth, was afraid to put Inspire back on  He has been using CPAP every night  Tries to also use CPAP and Inspire    Still works 3 days a week  Uses Inspire by itself only a few times the past few months  Lat week went to bed 1030 pm, woke up with a \"unique awakening\" with a dry mouth  Feels tongue pushing towards lower teeth     Since stopping alcohol, With CPAP, sleeps better  Alcohol helped him fall asleep quicker.   With doxepin, took 30 min to fall asleep  Takes melatonin 10 mg     In bed 10, asleep [id number]  With CPAP and no alcohol- no longer woke to use the bathroom  Up 630 am    Inspire Settings  Incoming Settings  Electrode configuration B off minus off  Amplitude (V) 0.9  Range (V) 0.4 to 1.3  Pulse width (us)/ Rate (Hz)  90/33  Start delay (min) 30  Pause Time (min) 30  Therapy Duration (hr) 8    Inspire Usage Data  Used 463 hours, 21 hours per week       Sitting and reading: (P) Would never doze  Watching TV: (P) Would never doze  Sitting, inactive in a public place (e.g. a theatre or a meeting): (P) Would never doze  As a passenger in a car for an hour without a break: (P) Would never doze  Lying down to rest in the afternoon when circumstances permit: (P) Would never doze  Sitting and talking to someone: (P) Would never doze  Sitting quietly after a lunch without alcohol: (P) Would never doze  In a car, while stopped for a few minutes in " "traffic: (P) Would never doze  Total score: (P) 0       Review of Systems  Pertinent positives/negatives included in HPI and also as noted:         Objective     /79 (BP Location: Left arm, Patient Position: Sitting, Cuff Size: Large)   Ht 5' 9\" (1.753 m)   Wt 101 kg (223 lb)   BMI 32.93 kg/m²        Physical Exam  Visit Vitals  /79 (BP Location: Left arm, Patient Position: Sitting, Cuff Size: Large)   Ht 5' 9\" (1.753 m)   Wt 101 kg (223 lb)   BMI 32.93 kg/m²   Smoking Status Former   BSA 2.16 m²                                               Data  No results found for: \"HGB\", \"HCT\", \"MCV\"   Lab Results   Component Value Date    CALCIUM 9.5 02/09/2024    K 4.2 02/09/2024    CO2 27 02/09/2024     02/09/2024    BUN 11 02/09/2024    CREATININE 0.81 02/09/2024     Lab Results   Component Value Date    IRON 62 06/29/2022    TIBC 282 06/29/2022    FERRITIN 43.0 08/23/2024     Lab Results   Component Value Date    AST 14 02/09/2024    ALT 20 02/09/2024                 Incoming B 0.9  90/33- rapid protrusiion, not comfortable; 0.7v- same    Configuration A, 90/33   0.7, 0.8 minimal protrusion  0.9 v- slight protrusion  1.0- protrusion \"I can deal with this\"      "

## 2024-10-31 NOTE — PATIENT INSTRUCTIONS
"Thank you for trusting me with your care!    I know we often  cover a lot of information at the visit, so if you have follow-up questions, are unclear about the plan, or feel there were important items that we did not discuss or you did not receive clarity on, please don't hesitate to reach out to me.     [employer] messages are preferred for routine matters.  Please make sure to call for urgent matters as there can be a delay in responding to questions over [employer].    Increase Inspire by one level each week     IMPORTANT- Prior to a sleep study (at home or in the sleep lab), I strongly recommend contacting your medical insurance first to understand your benefits (including deductible if applicable), coverage for this test, and out of pocket costs.  Even if the test is approved by medical insurance, the cost to you is determined by your medical benefits.  You can also use [employer] on [facility]'s website, under the drop down \"Patients and Visitors\".  If you have concerns about your out of pocket costs for sleep testing, please contact me/the office before you complete the test and we can discuss if there are alternate options.     I recommend following this advice in general before any lab test, imaging test, doctor visit, surgery, or ordering CPAP supplies as it is best to understand your coverage to avoid unexpected bills after the fact.       Nursing Support:  When: Monday through Friday 7:30A-4:30PM except holidays  Where: Our direct line is [phone]  *3  *1.      If you are having a true emergency please call 911.  In the event that the line is busy or it is after hours please leave a voice message and we will return your call.  Please speak clearly, leaving your full name, birth date, best number to reach you and the reason for your call.   Medication refills: We will need the name of the medication, the dosage, the ordering provider, whether you get a 30 or 90 day refill, and the pharmacy name and " address.  Medications will be ordered by the provider only.  Nurses cannot call in prescriptions.  Please allow 7 days for medication refills.  Physician requested updates: If your provider requested that you call with an update after starting medication, please be ready to provide us the medication and dosage, what time you take your medication, the time you attempt to fall asleep, time you fall asleep, when you wake up, and what time you get out of bed.  Sleep Study Results: We will contact you with sleep study results and/or next steps after the physician has reviewed your testing.

## 2024-11-12 ENCOUNTER — TELEPHONE (OUTPATIENT)
Age: 79
End: 2024-11-12

## 2024-12-02 ENCOUNTER — OFFICE VISIT (OUTPATIENT)
Age: 79
End: 2024-12-02
Payer: COMMERCIAL

## 2024-12-02 VITALS
OXYGEN SATURATION: 96 % | HEIGHT: 69 IN | DIASTOLIC BLOOD PRESSURE: 60 MMHG | TEMPERATURE: 98.3 F | SYSTOLIC BLOOD PRESSURE: 130 MMHG | BODY MASS INDEX: 33.41 KG/M2 | WEIGHT: 225.6 LBS | HEART RATE: 64 BPM

## 2024-12-02 DIAGNOSIS — M77.8 RIGHT SHOULDER TENDINITIS: ICD-10-CM

## 2024-12-02 DIAGNOSIS — M15.0 PRIMARY GENERALIZED (OSTEO)ARTHRITIS: Primary | ICD-10-CM

## 2024-12-02 DIAGNOSIS — H04.123 DRY EYE SYNDROME OF BOTH EYES: ICD-10-CM

## 2024-12-02 DIAGNOSIS — G47.33 OSA (OBSTRUCTIVE SLEEP APNEA): ICD-10-CM

## 2024-12-02 DIAGNOSIS — M18.11 PRIMARY OSTEOARTHRITIS OF FIRST CARPOMETACARPAL JOINT OF RIGHT HAND: ICD-10-CM

## 2024-12-02 DIAGNOSIS — D50.8 OTHER IRON DEFICIENCY ANEMIA: ICD-10-CM

## 2024-12-02 DIAGNOSIS — M15.4 EROSIVE (OSTEO)ARTHRITIS: ICD-10-CM

## 2024-12-02 DIAGNOSIS — K21.9 GASTROESOPHAGEAL REFLUX DISEASE WITHOUT ESOPHAGITIS: ICD-10-CM

## 2024-12-02 PROCEDURE — 99214 OFFICE O/P EST MOD 30 MIN: CPT | Performed by: INTERNAL MEDICINE

## 2024-12-02 NOTE — PATIENT INSTRUCTIONS
I would avoid anti-inflammatory medications like naproxen in view of your GI history.  You can use arthritis strength Tylenol up to 2 tablets every 12 hours as needed.  Continue home exercise program.  If the left knee becomes more bothersome, we will get an x-ray and consider injection if needed.  Call if you feel that you have a flare of the arthritis in your hands and may need an injection.  Continue home exercise program.  Follow-up with primary care.  Follow-up with hematology.  Follow-up with GI as needed.

## 2024-12-02 NOTE — PROGRESS NOTES
Assessment/Plan:    Erosive (osteo)arthritis  Interphalangeal osteoarthritis with scattered deformities.  X-rays show severe osteoarthritis.  Clinically he appears to have signs and symptoms of erosive osteoarthritis/inflammatory osteoarthritis which has improved with injections for first CMC osteoarthritis and occupational therapy.  Continue home exercise program.  Will refer back to occupational therapy if symptoms warrant.  No need for disease modifying medication.  He has used occasional Naprosyn, however I suggested that he avoid nonsteroidals in view of his history of angioectasia in the ascending colon with history of bleeding.  Suggest arthritis strength Tylenol up to 2 tablets every 12 hours if needed.  He will call if he needs injections for first CMC osteoarthritis.  Follow-up 1 year or sooner if needed.  Continue to monitor.    Primary generalized (osteo)arthritis  Primary generalized osteoarthritis with interphalangeal osteoarthritis most prominent.  He has had excellent success with first CMC joint injections and occupational therapy.  Chronic low back pain nonradicular in nature.  Continue to monitor for radicular symptoms.  He had tried sulfasalazine for inflammatory osteoarthritis/erosive osteoarthritis without erosions, however he was intolerant and discontinued.  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  He will call should he need basal thumb injections or another course of occupational therapy or left knee steroid injection.  Plan follow-up in 1 year or sooner if needed.    Primary osteoarthritis of first carpometacarpal joint of right hand  CMC osteoarthritis with history of injection in the past with benefit.  Patient will call should he need additional basal thumb injections.  He did have occupational therapy with benefit.  Continue home exercise program.  Continue to monitor.    Right shoulder tendinitis  Right shoulder tendinitis improved with physical therapy.  He will  call should he have a flare requiring additional physical therapy.  Continue to monitor.    Iron deficiency anemia  Iron deficiency anemia likely secondary to lower GI bleeding secondary to angioectasia ascending colon on colonoscopy from October 2023.  No evidence of lower GI bleeding on recent colonoscopy this past October.  Patient did have IV iron infusions with the last infusion in February.  Recent ferritin 43.  Hematology did not feel that additional infusions were necessary at this time.  He will continue follow-up with hematology as scheduled.  She is rechecking lab work every 6 months.    Dry eye syndrome of both eyes  Chronic dry eye syndrome.  Sjogren's antibodies negative.  Continue to treat symptomatically.  Continue to monitor.    GERD (gastroesophageal reflux disease)  Continue low-dose pantoprazole 20 mg daily.  Avoid nonsteroidals in view of GI history.  Suggest arthritis strength Tylenol 2 tablets every 12 hours as needed.  Follow-up GI.         Problem List Items Addressed This Visit       NARCISA (obstructive sleep apnea)    Primary generalized (osteo)arthritis - Primary    Primary generalized osteoarthritis with interphalangeal osteoarthritis most prominent.  He has had excellent success with first CMC joint injections and occupational therapy.  Chronic low back pain nonradicular in nature.  Continue to monitor for radicular symptoms.  He had tried sulfasalazine for inflammatory osteoarthritis/erosive osteoarthritis without erosions, however he was intolerant and discontinued.  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  He will call should he need basal thumb injections or another course of occupational therapy or left knee steroid injection.  Plan follow-up in 1 year or sooner if needed.         Iron deficiency anemia    Iron deficiency anemia likely secondary to lower GI bleeding secondary to angioectasia ascending colon on colonoscopy from October 2023.  No evidence of lower GI  bleeding on recent colonoscopy this past October.  Patient did have IV iron infusions with the last infusion in February.  Recent ferritin 43.  Hematology did not feel that additional infusions were necessary at this time.  He will continue follow-up with hematology as scheduled.  She is rechecking lab work every 6 months.         GERD (gastroesophageal reflux disease)    Continue low-dose pantoprazole 20 mg daily.  Avoid nonsteroidals in view of GI history.  Suggest arthritis strength Tylenol 2 tablets every 12 hours as needed.  Follow-up GI.         Dry eye syndrome of both eyes    Chronic dry eye syndrome.  Sjogren's antibodies negative.  Continue to treat symptomatically.  Continue to monitor.         Primary osteoarthritis of first carpometacarpal joint of right hand    CMC osteoarthritis with history of injection in the past with benefit.  Patient will call should he need additional basal thumb injections.  He did have occupational therapy with benefit.  Continue home exercise program.  Continue to monitor.         Erosive (osteo)arthritis    Interphalangeal osteoarthritis with scattered deformities.  X-rays show severe osteoarthritis.  Clinically he appears to have signs and symptoms of erosive osteoarthritis/inflammatory osteoarthritis which has improved with injections for first CMC osteoarthritis and occupational therapy.  Continue home exercise program.  Will refer back to occupational therapy if symptoms warrant.  No need for disease modifying medication.  He has used occasional Naprosyn, however I suggested that he avoid nonsteroidals in view of his history of angioectasia in the ascending colon with history of bleeding.  Suggest arthritis strength Tylenol up to 2 tablets every 12 hours if needed.  He will call if he needs injections for first CMC osteoarthritis.  Follow-up 1 year or sooner if needed.  Continue to monitor.         Right shoulder tendinitis    Right shoulder tendinitis improved with  physical therapy.  He will call should he have a flare requiring additional physical therapy.  Continue to monitor.                Reviewed records, labs, and imaging with the patient in detail.  Counseled patient.  Discussion regarding my findings and recommendations.  Office visit with documentation 35 min.    Subjective:      Patient ID: Sylvain Rae is a 79 y.o. male.    Patient presents for follow-up of his osteoarthritis with significant interphalangeal OA.  He was previously treated with sulfasalazine in the past however he was unable to tolerate it and discontinued.  He uses Tylenol as needed.  His osteoarthritis symptoms are generally stable.  He does have OA in his first CMC joints.  He had injections done in his right first CMC joint and right index finger PIP joint in January 2023 and had excellent relief with this.  He also completed a course of hand therapy.  He had been experiencing right shoulder pain earlier this year and completed a course of physical therapy which provided significant relief for him.  Most recently he has noted intermittent left knee pain which comes and goes with walking.  It has not been significant enough to pursue additional treatment.  Other medical history significant for dry eyes and dry mouth, obstructive sleep apnea, GERD, DSAP, stasis dermatitis, hyperlipidemia, hypertension, lumbar spondylosis, and history of angioectasia ascending colon with bleeding and history of colon polyps.    He has a history of iron deficiency.  He is getting iron infusions and follows with Hematology.  He has a history of GERD and is taking pantoprazole.  He is following with GI.  He did have colonoscopy dated 10/14/2024 with prominent lymphoid aggregate on biopsy with no evidence of colitis or cancer.  He did have 2 tubular adenomas with no evidence for carcinoma.  He was felt to have angioectasia ascending colon with polyps on colonoscopy from October 2023 and was treated with IV iron  with the most recent infusion in February.  He follows with ENT and sleep medicine for his sleep apnea.  He had an Inspire implant placed in March 2023 and has not been able to adjust to it.  He has been using doxepin 30 mg nightly for sleep disturbance.    Lab work dated 8/23/2024 significant for CBC with white blood cell count 4.9 and absolute neutrophil count 3.0.  Platelet count 175.  Ferritin 43.        Allergies  No Known Allergies    Home Medications    Current Outpatient Medications:     acetaminophen (TYLENOL) 650 mg CR tablet, Take 650 mg by mouth every 8 (eight) hours as needed, Disp: , Rfl:     amLODIPine (NORVASC) 2.5 mg tablet, Take 1 tablet by mouth daily, Disp: , Rfl:     atorvastatin (LIPITOR) 10 mg tablet, TAKE 1 TABLET NIGHTLY, Disp: , Rfl:     candesartan-hydrochlorothiazide (ATACAND HCT) 32-12.5 MG per tablet, Take 1 tablet by mouth daily, Disp: , Rfl:     CIALIS 5 MG tablet, 5 mg daily as needed, Disp: , Rfl:     co-enzyme Q-10 30 mg, Take 30 mg by mouth daily, Disp: , Rfl:     cyanocobalamin (VITAMIN B-12) 1000 MCG tablet, Take 1,000 mcg by mouth daily, Disp: , Rfl:     doxepin (SINEquan) 10 mg/mL solution, Take 0.3 mL (3 mg total) by mouth daily at bedtime, Disp: 118 mL, Rfl: 1    FERROUS SULFATE PO, Take by mouth in the morning, Disp: , Rfl:     Glucosamine HCl 1500 MG TABS, Take 1 tablet by mouth in the morning, Disp: , Rfl:     LACTOBACILLUS ACID-PECTIN PO, Take 1 tablet by mouth in the morning, Disp: , Rfl:     metroNIDAZOLE (METROCREAM) 0.75 % cream, Apply topically in the morning, Disp: , Rfl:     Multiple Vitamins-Minerals (MULTIVITAMIN ADULT EXTRA C PO), Take 1 tablet by mouth in the morning, Disp: , Rfl:     NAPROXEN PO, Take by mouth as needed, Disp: , Rfl:     Oral Syringes MISC, Use daily at bedtime 1 mL syringe, Disp: 100 each, Rfl: 3    pantoprazole (PROTONIX) 20 mg tablet, Take 20 mg by mouth daily, Disp: , Rfl:     Restasis 0.05 % ophthalmic emulsion, , Disp: , Rfl:      sildenafil (VIAGRA) 100 mg tablet, TAKE 1 TABLET AS NEEDED FOR ERECTILE DYSFUNCTION, Disp: , Rfl:     Krill Oil 500 MG CAPS, Take 500 mg by mouth in the morning (Patient not taking: Reported on 12/2/2024), Disp: , Rfl:     Past Medical History  Past Medical History:   Diagnosis Date    Colon polyp     CPAP (continuous positive airway pressure) dependence     Dry eye syndrome     GERD (gastroesophageal reflux disease)     Hyperlipidemia     Hypertension     Iron deficiency anemia     Lumbar spondylosis     NARCISA on CPAP     Osteoarthritis     Sleep difficulties 2007    Tonsillitis 1958       Past Surgical History   Past Surgical History:   Procedure Laterality Date    CATARACT EXTRACTION      CATARACT EXTRACTION, BILATERAL      COLONOSCOPY      EXAMINATION UNDER ANESTHESIA N/A 09/22/2022    Procedure: EXAM UNDER ANESTHESIA (EUA) Drug Induced Sleep Endoscopy;  Surgeon: Tyrone Sosa MD;  Location: [facility];  Service: ENT    LIPOMA RESECTION      PILONIDAL CYST EXCISION      RI OPEN IMPLTJ HPGLSL NRV NSTIM RA PG&RESPIR SENSOR N/A 3/16/2023    Procedure: INSERTION UPPER AIRWAY STIMULATOR;  Surgeon: Tyrone Sosa MD;  Location: [facility];  Service: ENT    TONSILLECTOMY      UMBILICAL HERNIA REPAIR         Family History   Family History   Problem Relation Age of Onset    Ulcerative colitis Brother         colitis unspecified    Arthritis Family     Diabetes Family     Cancer Family     Hypertension Family     Heart disease Family     Hashimoto's thyroiditis Family     Aneurysm Family     Hypertension Mother     Sleep apnea Neg Hx        The following portions of the patient's history were reviewed and updated as appropriate: allergies, current medications, past family history, past medical history, past social history, past surgical history, and problem list.    Review of Systems   Constitutional:  Positive for fatigue. Negative for chills and fever.   HENT:  Positive for dental problem (recent cavities(2)).  "Negative for hearing loss, sore throat and tinnitus.         Dry mouth from Inspire  Phlegm on right side improving   Eyes:  Negative for pain and visual disturbance.        Dry eyes to start Restasis   Respiratory:  Negative for cough and shortness of breath.         NARCISA   Cardiovascular:  Negative for chest pain and palpitations.   Gastrointestinal:  Negative for abdominal pain, nausea and vomiting.   Genitourinary:  Negative for difficulty urinating.   Musculoskeletal:  Positive for arthralgias, back pain (non radicular) and neck pain. Negative for gait problem, joint swelling, myalgias and neck stiffness.   Skin:  Negative for rash.   Neurological:  Positive for numbness (fingers). Negative for dizziness, seizures, weakness and headaches.   Psychiatric/Behavioral:  Negative for confusion, decreased concentration and sleep disturbance.          Objective:      /60   Pulse 64   Temp 98.3 °F (36.8 °C) (Tympanic)   Ht 5' 8.75\" (1.746 m)   Wt 102 kg (225 lb 9.6 oz)   SpO2 96%   BMI 33.56 kg/m²          Physical Exam  Vitals reviewed.   Constitutional:       Appearance: Normal appearance.   HENT:      Head: Normocephalic.      Nose:      Comments: Nose and throat unremarkable  Cardiovascular:      Rate and Rhythm: Normal rate and regular rhythm.   Pulmonary:      Breath sounds: Normal breath sounds.   Abdominal:      Palpations: Abdomen is soft.   Musculoskeletal:      Comments: Neck decreased lateral flexion. Spasm posterior cervical and shoulder girdle muscles.  Full range of motion bilateral shoulders, elbows, wrists.  Interphalangeal OA changes, scarring 1st CMC joints, Heberden's nodes, Sonali's nodes bilateral hands. Deformities DIP joints.   No synovitis noted.  Straight leg raising negative bilaterally.  Schober's negative.  Spasm lumbosacral paraspinals.  Full range of motion bilateral hips.  Knees slight cool effusion left with slightly decreased flexion and early varus deformity.  Bilateral " patellofemoral crepitus.  Ankles full range of motion.  Feet no synovitis.   Skin:     General: Skin is warm and dry.   Neurological:      General: No focal deficit present.      Mental Status: He is alert.               This note was written in part using the assistance of the [employer] [id number] microphone system. Those portions using this system have been dictated and not read.

## 2024-12-09 NOTE — ASSESSMENT & PLAN NOTE
Primary generalized osteoarthritis with interphalangeal osteoarthritis most prominent.  He has had excellent success with first CMC joint injections and occupational therapy.  Chronic low back pain nonradicular in nature.  Continue to monitor for radicular symptoms.  He had tried sulfasalazine for inflammatory osteoarthritis/erosive osteoarthritis without erosions, however he was intolerant and discontinued.  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  He will call should he need basal thumb injections or another course of occupational therapy or left knee steroid injection.  Plan follow-up in 1 year or sooner if needed.

## 2024-12-09 NOTE — ASSESSMENT & PLAN NOTE
CMC osteoarthritis with history of injection in the past with benefit.  Patient will call should he need additional basal thumb injections.  He did have occupational therapy with benefit.  Continue home exercise program.  Continue to monitor.

## 2024-12-09 NOTE — ASSESSMENT & PLAN NOTE
Right shoulder tendinitis improved with physical therapy.  He will call should he have a flare requiring additional physical therapy.  Continue to monitor.

## 2024-12-09 NOTE — ASSESSMENT & PLAN NOTE
Iron deficiency anemia likely secondary to lower GI bleeding secondary to angioectasia ascending colon on colonoscopy from October 2023.  No evidence of lower GI bleeding on recent colonoscopy this past October.  Patient did have IV iron infusions with the last infusion in February.  Recent ferritin 43.  Hematology did not feel that additional infusions were necessary at this time.  He will continue follow-up with hematology as scheduled.  She is rechecking lab work every 6 months.

## 2024-12-09 NOTE — ASSESSMENT & PLAN NOTE
Interphalangeal osteoarthritis with scattered deformities.  X-rays show severe osteoarthritis.  Clinically he appears to have signs and symptoms of erosive osteoarthritis/inflammatory osteoarthritis which has improved with injections for first CMC osteoarthritis and occupational therapy.  Continue home exercise program.  Will refer back to occupational therapy if symptoms warrant.  No need for disease modifying medication.  He has used occasional Naprosyn, however I suggested that he avoid nonsteroidals in view of his history of angioectasia in the ascending colon with history of bleeding.  Suggest arthritis strength Tylenol up to 2 tablets every 12 hours if needed.  He will call if he needs injections for first CMC osteoarthritis.  Follow-up 1 year or sooner if needed.  Continue to monitor.

## 2024-12-09 NOTE — ASSESSMENT & PLAN NOTE
Continue low-dose pantoprazole 20 mg daily.  Avoid nonsteroidals in view of GI history.  Suggest arthritis strength Tylenol 2 tablets every 12 hours as needed.  Follow-up GI.

## 2024-12-09 NOTE — ASSESSMENT & PLAN NOTE
Chronic dry eye syndrome.  Sjogren's antibodies negative.  Continue to treat symptomatically.  Continue to monitor.

## 2025-05-14 DIAGNOSIS — G47.00 INSOMNIA, UNSPECIFIED TYPE: ICD-10-CM

## 2025-05-15 NOTE — TELEPHONE ENCOUNTER
Last office visit 10/31/24.    Per office note, patient was supposed to schedule 4-week follow up but this was never scheduled.

## 2025-05-16 RX ORDER — DOXEPIN HYDROCHLORIDE 10 MG/ML
3 SOLUTION ORAL
Qty: 120 ML | Refills: 1 | Status: SHIPPED | OUTPATIENT
Start: 2025-05-16

## 2025-05-21 ENCOUNTER — ESTABLISHED COMPREHENSIVE EXAM (OUTPATIENT)
Dept: URBAN - METROPOLITAN AREA CLINIC 6 | Facility: CLINIC | Age: 80
End: 2025-05-21

## 2025-05-21 DIAGNOSIS — H02.882: ICD-10-CM

## 2025-05-21 DIAGNOSIS — H40.012: ICD-10-CM

## 2025-05-21 DIAGNOSIS — H35.371: ICD-10-CM

## 2025-05-21 DIAGNOSIS — Z96.1: ICD-10-CM

## 2025-05-21 DIAGNOSIS — H18.513: ICD-10-CM

## 2025-05-21 DIAGNOSIS — H02.885: ICD-10-CM

## 2025-05-21 DIAGNOSIS — H04.123: ICD-10-CM

## 2025-05-21 PROCEDURE — 92133 CPTRZD OPH DX IMG PST SGM ON: CPT

## 2025-05-21 PROCEDURE — 92014 COMPRE OPH EXAM EST PT 1/>: CPT

## 2025-05-21 PROCEDURE — 92083 EXTENDED VISUAL FIELD XM: CPT

## 2025-05-21 ASSESSMENT — VISUAL ACUITY
OD_CC: 20/30-1
OS_CC: 20/20-2
OU_CC: J1+

## 2025-05-21 ASSESSMENT — TONOMETRY
OS_IOP_MMHG: 11
OD_IOP_MMHG: 13

## 2025-06-25 ENCOUNTER — TELEPHONE (OUTPATIENT)
Dept: RHEUMATOLOGY | Facility: CLINIC | Age: 80
End: 2025-06-25

## (undated) DEVICE — 3M™ STERI-STRIP™ REINFORCED ADHESIVE SKIN CLOSURES, R1547, 1/2 IN X 4 IN (12 MM X 100 MM), 6 STRIPS/ENVELOPE: Brand: 3M™ STERI-STRIP™

## (undated) DEVICE — 3M™ IOBAN™ 2 ANTIMICROBIAL INCISE DRAPE 6650EZ: Brand: IOBAN™ 2

## (undated) DEVICE — SUT SILK PERMA-HAND 3-0 18IN A182H

## (undated) DEVICE — BULB SYRINGE,IRRIGATION WITH PROTECTIVE CAP: Brand: DOVER

## (undated) DEVICE — GAUZE SPONGES,16 PLY: Brand: CURITY

## (undated) DEVICE — VESSEL LOOPS X-RAY DETECTABLE: Brand: DEROYAL

## (undated) DEVICE — ELECTRODE 8227304 5PK PRASS PR 18MM ROHS

## (undated) DEVICE — GLOVE SRG BIOGEL 7.5

## (undated) DEVICE — SUT VICRYL 3-0 SH 27 IN J416H

## (undated) DEVICE — PACK UNIVERSAL NECK

## (undated) DEVICE — 3M™ TEGADERM™ TRANSPARENT FILM DRESSING FRAME STYLE, 1626W, 4 IN X 4-3/4 IN (10 CM X 12 CM), 50/CT 4CT/CASE: Brand: 3M™ TEGADERM™

## (undated) DEVICE — SUT SILK 2-0 SH 30 IN K833H

## (undated) DEVICE — GLOVE INDICATOR PI UNDERGLOVE SZ 7.5 BLUE

## (undated) DEVICE — SYRINGE 10ML LL

## (undated) DEVICE — SUT SILK 3-0 18 IN A184H

## (undated) DEVICE — SUT MONOCRYL 4-0 PS-2 27 IN Y426H

## (undated) DEVICE — DRAPE SHEET THREE QUARTER

## (undated) DEVICE — ALCOHOL PREP, 2 PLY, LARGE, MADE IN USA, SATURATED WITH 70% ISOPROPYL ALCOHOL, FOR EXTERNAL USE ONLY: Brand: WEBCOL

## (undated) DEVICE — IV CATH INTROCAN 18G X 1 1/4 SAFETY

## (undated) DEVICE — PROVE COVER: Brand: UNBRANDED

## (undated) DEVICE — DECANTER: Brand: UNBRANDED

## (undated) DEVICE — SKIN MARKER DUAL TIP WITH RULER CAP, FLEXIBLE RULER AND LABELS: Brand: DEVON

## (undated) DEVICE — 3M™ STERI-STRIP™ COMPOUND BENZOIN TINCTURE 40 BAGS/CARTON 4 CARTONS/CASE C1544: Brand: 3M™ STERI-STRIP™

## (undated) DEVICE — WET SKIN PREP TRAY: Brand: MEDLINE INDUSTRIES, INC.

## (undated) DEVICE — 10FR FRAZIER SUCTION HANDLE: Brand: CARDINAL HEALTH

## (undated) DEVICE — NEEDLE 18 G X 1 1/2

## (undated) DEVICE — TONGUE DEPRESSOR STERILE

## (undated) DEVICE — SUT SILK 3-0 SH CR/8 18 IN C013D

## (undated) DEVICE — NEEDLE 25G X 1 1/2

## (undated) DEVICE — REMOTE SLEEP INSPIRE

## (undated) DEVICE — BIPOLAR CORD DISP

## (undated) DEVICE — 3M™ TEGADERM™ TRANSPARENT FILM DRESSING FRAME STYLE, 1624W, 2-3/8 IN X 2-3/4 IN (6 CM X 7 CM), 100/CT 4CT/CASE: Brand: 3M™ TEGADERM™

## (undated) DEVICE — ELECTRODE BLADE MOD E-Z CLEAN  2.75IN 7CM -0012AM

## (undated) DEVICE — PROBE 8225401 5PK SD-SD BIPOL STIM ROHS

## (undated) DEVICE — SUPER SPONGES,MEDIUM: Brand: KERLIX

## (undated) DEVICE — INTENDED FOR TISSUE SEPARATION, AND OTHER PROCEDURES THAT REQUIRE A SHARP SURGICAL BLADE TO PUNCTURE OR CUT.: Brand: BARD-PARKER SAFETY BLADES SIZE 15, STERILE

## (undated) DEVICE — AMBU ASCOPE 4 RHINOLARYNGO SLIM SINGLE-USE, FLEXIBLE RHINOLARYNGOSCOPE STERILE FROM PACKAGE. INSERTION CORD DIAMETER 3.0 MM, LENGHT OF 300 MM. AND A 130-DEGREE BENDING ANGLE. MINIMUM PURCHASE 5 PCS = 1 BOX: Brand: ASCOPE™ 4 RHINOLARYNGO SLIM

## (undated) DEVICE — TIBURON SPLIT SHEET: Brand: CONVERTORS

## (undated) DEVICE — SUT SILK 3-0 RB-1 CV-23 18IN C053D